# Patient Record
Sex: MALE | Race: WHITE | Employment: OTHER | URBAN - METROPOLITAN AREA
[De-identification: names, ages, dates, MRNs, and addresses within clinical notes are randomized per-mention and may not be internally consistent; named-entity substitution may affect disease eponyms.]

---

## 2018-08-27 ENCOUNTER — TRANSFERRED RECORDS (OUTPATIENT)
Dept: HEALTH INFORMATION MANAGEMENT | Facility: CLINIC | Age: 63
End: 2018-08-27

## 2018-09-21 ENCOUNTER — TRANSFERRED RECORDS (OUTPATIENT)
Dept: HEALTH INFORMATION MANAGEMENT | Facility: CLINIC | Age: 63
End: 2018-09-21

## 2018-10-24 ENCOUNTER — TRANSFERRED RECORDS (OUTPATIENT)
Dept: HEALTH INFORMATION MANAGEMENT | Facility: CLINIC | Age: 63
End: 2018-10-24

## 2018-10-25 ENCOUNTER — TELEPHONE (OUTPATIENT)
Dept: NEUROSURGERY | Facility: CLINIC | Age: 63
End: 2018-10-25

## 2018-10-25 ENCOUNTER — MEDICAL CORRESPONDENCE (OUTPATIENT)
Dept: HEALTH INFORMATION MANAGEMENT | Facility: CLINIC | Age: 63
End: 2018-10-25

## 2018-10-25 NOTE — TELEPHONE ENCOUNTER
----- Message from Dawna Shaikh sent at 10/25/2018 10:46 AM CDT -----  Regarding: Referral  Received referral from Delaney on pt. Unable to reach pt, LVM for patient to call back so that we can assist with scheduling. Referral and notes scanned in [facility].

## 2018-10-26 ENCOUNTER — TRANSFERRED RECORDS (OUTPATIENT)
Dept: HEALTH INFORMATION MANAGEMENT | Facility: CLINIC | Age: 63
End: 2018-10-26

## 2018-11-05 ENCOUNTER — OFFICE VISIT (OUTPATIENT)
Dept: NEUROSURGERY | Facility: CLINIC | Age: 63
End: 2018-11-05
Attending: NEUROLOGICAL SURGERY
Payer: COMMERCIAL

## 2018-11-05 VITALS
OXYGEN SATURATION: 93 % | DIASTOLIC BLOOD PRESSURE: 78 MMHG | TEMPERATURE: 97.6 F | HEART RATE: 67 BPM | SYSTOLIC BLOOD PRESSURE: 121 MMHG

## 2018-11-05 DIAGNOSIS — M51.9 DISC DISORDER OF LUMBAR REGION: Primary | ICD-10-CM

## 2018-11-05 PROCEDURE — G0463 HOSPITAL OUTPT CLINIC VISIT: HCPCS

## 2018-11-05 PROCEDURE — 99203 OFFICE O/P NEW LOW 30 MIN: CPT | Performed by: NEUROLOGICAL SURGERY

## 2018-11-05 RX ORDER — GABAPENTIN 300 MG/1
300 TABLET, FILM COATED ORAL
Status: ON HOLD | COMMUNITY
End: 2020-12-07

## 2018-11-05 RX ORDER — FAMOTIDINE 20 MG
TABLET ORAL DAILY
COMMUNITY

## 2018-11-05 RX ORDER — HYDROCHLOROTHIAZIDE 25 MG/1
25 TABLET ORAL DAILY
COMMUNITY

## 2018-11-05 ASSESSMENT — PAIN SCALES - GENERAL: PAINLEVEL: MODERATE PAIN (5)

## 2018-11-05 NOTE — MR AVS SNAPSHOT
"              After Visit Summary   11/5/2018    Subhash Cantu    MRN: [id number]           Patient Information     Date Of Birth          1955        Visit Information        Provider Department      11/5/2018 2:45 PM Vernon Irvin MD [facility]        Care Instructions    1. Referral to [facility] for L4-5 discogram  2. They will call you to schedule  3.  We will contact you with results and next steps after discogram    Please call our clinic with any questions or concerns: [phone]              Follow-ups after your visit        Who to contact     If you have questions or need follow up information about today's clinic visit or your schedule please contact [facility] NEUROSURGERY [facility] directly at [phone].  Normal or non-critical lab and imaging results will be communicated to you by MyChart, letter or phone within 4 business days after the clinic has received the results. If you do not hear from us within 7 days, please contact the clinic through MyChart or phone. If you have a critical or abnormal lab result, we will notify you by phone as soon as possible.  Submit refill requests through [employer] or call your pharmacy and they will forward the refill request to us. Please allow 3 business days for your refill to be completed.          Additional Information About Your Visit        MyChart Information     [employer] lets you send messages to your doctor, view your test results, renew your prescriptions, schedule appointments and more. To sign up, go to [email or web address] . Click on \"Log in\" on the left side of the screen, which will take you to the Welcome page. Then click on \"Sign up Now\" on the right side of the page.     You will be asked to enter the access code listed below, as well as some personal information. Please follow the directions to create your username and password.     Your access code is: [email or web address]  Expires: 2/3/2019  3:21 PM     Your " access code will  in 90 days. If you need help or a new code, please call your [place] clinic or [phone].        Care EveryWhere ID     This is your Care EveryWhere ID. This could be used by other organizations to access your [place] medical records  [id number]        Your Vitals Were     Pulse Temperature Pulse Oximetry             67 97.6  F (36.4  C) (Oral) 93%          Blood Pressure from Last 3 Encounters:   18 121/78    Weight from Last 3 Encounters:   No data found for Wt              Today, you had the following     No orders found for display      Information about OPIOIDS     PRESCRIPTION OPIOIDS: WHAT YOU NEED TO KNOW   We gave you an opioid (narcotic) pain medicine. It is important to manage your pain, but opioids are not always the best choice. You should first try all the other options your care team gave you. Take this medicine for as short a time (and as few doses) as possible.    Some activities can increase your pain, such as bandage changes or therapy sessions. It may help to take your pain medicine 30 to 60 minutes before these activities. Reduce your stress by getting enough sleep, working on hobbies you enjoy and practicing relaxation or meditation. Talk to your care team about ways to manage your pain beyond prescription opioids.    These medicines have risks:    DO NOT drive when on new or higher doses of pain medicine. These medicines can affect your alertness and reaction times, and you could be arrested for driving under the influence (DUI). If you need to use opioids long-term, talk to your care team about driving.    DO NOT operate heavy machinery    DO NOT do any other dangerous activities while taking these medicines.    DO NOT drink any alcohol while taking these medicines.     If the opioid prescribed includes acetaminophen, DO NOT take with any other medicines that contain acetaminophen. Read all labels carefully. Look for the word  acetaminophen  or  Tylenol.   Ask your pharmacist if you have questions or are unsure.    You can get addicted to pain medicines, especially if you have a history of addiction (chemical, alcohol or substance dependence). Talk to your care team about ways to reduce this risk.    All opioids tend to cause constipation. Drink plenty of water and eat foods that have a lot of fiber, such as fruits, vegetables, prune juice, apple juice and high-fiber cereal. Take a laxative (Miralax, milk of magnesia, Colace, Senna) if you don t move your bowels at least every other day. Other side effects include upset stomach, sleepiness, dizziness, throwing up, tolerance (needing more of the medicine to have the same effect), physical dependence and slowed breathing.    Store your pills in a secure place, locked if possible. We will not replace any lost or stolen medicine. If you don t finish your medicine, please throw away (dispose) as directed by your pharmacist. The [place] [employer]ution [employer] has more information about safe disposal: [email or web address]://[email or web address].[email or web address]/living-green/managing-unwanted-medications         Primary Care Provider Office Phone # Fax #    Abhinav SAUCEDO Becca [phone]        [facility] [address]        Equal Access to Services     MARK RUIZ : Viola payneo Soomaali, waaxda luqadaha, qaybta kaalmada adeegyada, cass rashid. So [facility] [phone].    ATENCIÓN: Si habla español, tiene a alvarez disposición servicios gratuitos de asistencia lingüística. Llame al [phone].    We comply with applicable federal civil rights laws and [place] laws. We do not discriminate on the basis of race, color, national origin, age, disability, sex, sexual orientation, or gender identity.            Thank you!     Thank you for choosing [facility] NEUROSURGERY [facility]  for your care. Our goal is always to provide you with excellent care. Hearing back from our  patients is one way we can continue to improve our services. Please take a few minutes to complete the written survey that you may receive in the mail after your visit with us. Thank you!             Your Updated Medication List - Protect others around you: Learn how to safely use, store and throw away your medicines at www.disposemymeds.org.          This list is accurate as of 11/5/18  3:21 PM.  Always use your most recent med list.                   Brand Name Dispense Instructions for use Diagnosis    FISH OIL + D3 PO           gabapentin 300 MG 24 hr tablet    GRALISE     Take 300 mg by mouth daily (with dinner) 2 am, 3 mid day, 3 at bed        hydrochlorothiazide 12.5 MG Tabs tablet      Take 25 mg by mouth daily        MULTIVITAMIN ADULT PO           NORVASC PO      Take 25 mg by mouth daily        OXYCODONE HCL PO      Take 5 mg by mouth        TRAZODONE HCL PO           VITAMIN C PO           Vitamin D (Cholecalciferol) 1000 units Caps

## 2018-11-05 NOTE — LETTER
11/5/2018         RE: Subhash Cantu  [address]  [place] [address]        Dear Colleague,    Thank you for referring your patient, Subhash Cantu, to the [facility] CLINIC. Please see a copy of my visit note below.    I was asked by Dr. Shabazz to see this patient in consultation    63M w/ hx L4-5 microdiscectomy (1989), with back pain.  Progressive back pain over several years with extensive management.  Numerous GOPAL, Lumbar RFA, and multiple courses of PT without improvement.  Currently using an LSO brace with minor improvement.  Prior RFA did not help at all.  Saw [facility], who had offered a 4 level lumbar fusion.  MRI with multiple levels of mild disc degeneration, worst at L4-5 with right laminotomies.       No past medical history on file.  No past surgical history on file.  Social History     Social History     Marital status:      Spouse name: N/A     Number of children: N/A     Years of education: N/A     Occupational History     Not on file.     Social History Main Topics     Smoking status: Not on file     Smokeless tobacco: Not on file     Alcohol use Not on file     Drug use: Not on file     Sexual activity: Not on file     Other Topics Concern     Not on file     Social History Narrative     No family history on file.     ROS: 10 point ROS neg other than the symptoms noted above in the HPI.    Physical Exam  There were no vitals taken for this visit.  HEENT:  Normocephalic, atraumatic.  PERRLA.  EOM s intact.  Visual fields full to gross exam  Neck:  Supple, non-tender, without lymphadenopathy.  Heart:  No peripheral edema  Lungs:  No SOB  Abdomen:  Non-distended.   Skin:  Warm and dry.  Extremities:  No edema, cyanosis or clubbing.  Psychiatric:  No apparent distress  Musculoskeletal:  Normal bulk and tone    NEUROLOGICAL EXAMINATION:     Mental status:  Alert and Oriented x 3, speech is fluent.  Cranial nerves:  II-XII intact.   Motor:     Shoulder Abduction:  Right:  5/5   Left:  5/5  Biceps:                      Right:  5/5   Left:  5/5  Triceps:                     Right:  5/5   Left:  5/5  Wrist Extensors:       Right:  5/5   Left:  5/5  Wrist Flexors:           Right:  5/5   Left:  5/5  interosseus :            Right:  5/5   Left:  5/5   Hip Flexor:                Right: 5/5  Left:  5/5  Hip Adductor:             Right:  5/5  Left:  5/5  Hip Abductor:             Right:  5/5  Left:  5/5  Gastroc Soleus:        Right:  5/5  Left:  5/5  Tib/Ant:                      Right:  5/5  Left:  5/5  EHL:                     Right:  5/5  Left:  5/5  Sensation:  Intact  Reflexes:  Negative Babinski.  Negative Clonus.  Negative Townsend's.  Coordination:  Smooth finger to nose testing.   Negative pronator drift.  Smooth tandem walking.    A/P:  63M w/ hx L4-5 microdiscectomy (1989), with back pain    I had a discussion with the patient, reviewing the history, symptoms, and imaging  Discussed that I would favor L4-5 discogram as a next step  Pending results, could discuss further options including arthroplasty versus single level fusion         Again, thank you for allowing me to participate in the care of your patient.        Sincerely,        Vernon Irvin MD

## 2018-11-05 NOTE — NURSING NOTE
Subhash Cantu is a 63 year old male who presents for:  Chief Complaint   Patient presents with     Neurologic Problem     referral from Delaney Hemphill for second opinion, surgical        Vitals:    Vitals:    11/05/18 1452   BP: 121/78   Pulse: 67   Temp: 97.6  F (36.4  C)   TempSrc: Oral   SpO2: 93%       BMI:  There is no height or weight on file to calculate BMI.    Pain Score:  Moderate Pain (5)        Dayana Barrera CMA, AAS

## 2018-11-05 NOTE — PROGRESS NOTES
I was asked by Dr. Shabazz to see this patient in consultation    63M w/ hx L4-5 microdiscectomy (1989), with back pain.  Progressive back pain over several years with extensive management.  Numerous GOPAL, Lumbar RFA, and multiple courses of PT without improvement.  Currently using an LSO brace with minor improvement.  Prior RFA did not help at all.  Saw [facility], who had offered a 4 level lumbar fusion.  MRI with multiple levels of mild disc degeneration, worst at L4-5 with right laminotomies.       No past medical history on file.  No past surgical history on file.  Social History     Social History     Marital status:      Spouse name: N/A     Number of children: N/A     Years of education: N/A     Occupational History     Not on file.     Social History Main Topics     Smoking status: Not on file     Smokeless tobacco: Not on file     Alcohol use Not on file     Drug use: Not on file     Sexual activity: Not on file     Other Topics Concern     Not on file     Social History Narrative     No family history on file.     ROS: 10 point ROS neg other than the symptoms noted above in the HPI.    Physical Exam  There were no vitals taken for this visit.  HEENT:  Normocephalic, atraumatic.  PERRLA.  EOM s intact.  Visual fields full to gross exam  Neck:  Supple, non-tender, without lymphadenopathy.  Heart:  No peripheral edema  Lungs:  No SOB  Abdomen:  Non-distended.   Skin:  Warm and dry.  Extremities:  No edema, cyanosis or clubbing.  Psychiatric:  No apparent distress  Musculoskeletal:  Normal bulk and tone    NEUROLOGICAL EXAMINATION:     Mental status:  Alert and Oriented x 3, speech is fluent.  Cranial nerves:  II-XII intact.   Motor:    Shoulder Abduction:  Right:  5/5   Left:  5/5  Biceps:                      Right:  5/5   Left:  5/5  Triceps:                     Right:  5/5   Left:  5/5  Wrist Extensors:       Right:  5/5   Left:  5/5  Wrist Flexors:           Right:  5/5   Left:   5/5  interosseus :            Right:  5/5   Left:  5/5   Hip Flexor:                Right: 5/5  Left:  5/5  Hip Adductor:             Right:  5/5  Left:  5/5  Hip Abductor:             Right:  5/5  Left:  5/5  Gastroc Soleus:        Right:  5/5  Left:  5/5  Tib/Ant:                      Right:  5/5  Left:  5/5  EHL:                     Right:  5/5  Left:  5/5  Sensation:  Intact  Reflexes:  Negative Babinski.  Negative Clonus.  Negative Townsend's.  Coordination:  Smooth finger to nose testing.   Negative pronator drift.  Smooth tandem walking.    A/P:  63M w/ hx L4-5 microdiscectomy (1989), with back pain    I had a discussion with the patient, reviewing the history, symptoms, and imaging  Discussed that I would favor L4-5 discogram as a next step  Pending results, could discuss further options including arthroplasty versus single level fusion

## 2018-11-05 NOTE — PATIENT INSTRUCTIONS
1. Referral to [facility] for L4-5 discogram  2. They will call you to schedule  3.  We will contact you with results and next steps after discogram    Please call our clinic with any questions or concerns: [phone]

## 2018-11-20 ENCOUNTER — TRANSFERRED RECORDS (OUTPATIENT)
Dept: HEALTH INFORMATION MANAGEMENT | Facility: CLINIC | Age: 63
End: 2018-11-20

## 2018-12-03 ENCOUNTER — OFFICE VISIT (OUTPATIENT)
Dept: NEUROSURGERY | Facility: CLINIC | Age: 63
End: 2018-12-03
Attending: NEUROLOGICAL SURGERY
Payer: COMMERCIAL

## 2018-12-03 VITALS
HEART RATE: 76 BPM | OXYGEN SATURATION: 92 % | SYSTOLIC BLOOD PRESSURE: 144 MMHG | DIASTOLIC BLOOD PRESSURE: 84 MMHG | TEMPERATURE: 98.6 F

## 2018-12-03 DIAGNOSIS — M51.9 DISC DISORDER OF LUMBAR REGION: Primary | ICD-10-CM

## 2018-12-03 PROCEDURE — 99213 OFFICE O/P EST LOW 20 MIN: CPT | Performed by: NEUROLOGICAL SURGERY

## 2018-12-03 PROCEDURE — G0463 HOSPITAL OUTPT CLINIC VISIT: HCPCS

## 2018-12-03 ASSESSMENT — PAIN SCALES - GENERAL: PAINLEVEL: SEVERE PAIN (6)

## 2018-12-03 NOTE — NURSING NOTE
Subhash Cantu is a 63 year old male who presents for:  Chief Complaint   Patient presents with     Neurologic Problem     follow up lumbar DDD,  discogram denied by insurance        Vitals:    Vitals:    12/03/18 1517   BP: 144/84   BP Location: Right arm   Patient Position: Sitting   Cuff Size: Adult Large   Pulse: 76   Temp: 98.6  F (37  C)   TempSrc: Oral   SpO2: 92%       BMI:  There is no height or weight on file to calculate BMI.    Pain Score:  Severe Pain (6)        Dayana Barrera CMA, AAS

## 2018-12-03 NOTE — LETTER
12/3/2018         RE: Subhash Cantu  [address]  [place] [address]        Dear Colleague,    Thank you for referring your patient, Subhash Cantu, to the [facility]UROSURGERY CLINIC. Please see a copy of my visit note below.    63M w/ hx L4-5 microdiscectomy (1989), with back pain.  Progressive back pain over several years with extensive management.  Numerous GOPAL, Lumbar RFA, and multiple courses of PT without improvement.  Currently using an LSO brace with minor improvement.  Prior RFA did not help at all.  Saw [facility], who had offered a 4 level lumbar fusion.  MRI with multiple levels of mild disc degeneration, worst at L4-5 with right laminotomies.     Returns for follow up.  Discogram was denied by insurance.    History reviewed. No pertinent past medical history.  History reviewed. No pertinent surgical history.  Social History     Social History     Marital status:      Spouse name: N/A     Number of children: N/A     Years of education: N/A     Occupational History     Not on file.     Social History Main Topics     Smoking status: Former Smoker     Smokeless tobacco: Never Used      Comment: former smoker     Alcohol use Not on file     Drug use: Not on file     Sexual activity: Not on file     Other Topics Concern     Not on file     Social History Narrative     History reviewed. No pertinent family history.     ROS: 10 point ROS neg other than the symptoms noted above in the HPI.    Physical Exam  /84 (BP Location: Right arm, Patient Position: Sitting, Cuff Size: Adult Large)  Pulse 76  Temp 98.6  F (37  C) (Oral)  SpO2 92%  HEENT:  Normocephalic, atraumatic.  PERRLA.  EOM s intact.  Visual fields full to gross exam  Neck:  Supple, non-tender, without lymphadenopathy.  Heart:  No peripheral edema  Lungs:  No SOB  Abdomen:  Non-distended.   Skin:  Warm and dry.  Extremities:  No edema, cyanosis or clubbing.  Psychiatric:  No apparent  distress  Musculoskeletal:  Normal bulk and tone    NEUROLOGICAL EXAMINATION:     Mental status:  Alert and Oriented x 3, speech is fluent.  Cranial nerves:  II-XII intact.   Motor:    Shoulder Abduction:  Right:  5/5   Left:  5/5  Biceps:                      Right:  5/5   Left:  5/5  Triceps:                     Right:  5/5   Left:  5/5  Wrist Extensors:       Right:  5/5   Left:  5/5  Wrist Flexors:           Right:  5/5   Left:  5/5  interosseus :            Right:  5/5   Left:  5/5   Hip Flexor:                Right: 5/5  Left:  5/5  Hip Adductor:             Right:  5/5  Left:  5/5  Hip Abductor:             Right:  5/5  Left:  5/5  Gastroc Soleus:        Right:  5/5  Left:  5/5  Tib/Ant:                      Right:  5/5  Left:  5/5  EHL:                     Right:  5/5  Left:  5/5  Sensation:  Intact  Reflexes:  Negative Babinski.  Negative Clonus.  Negative Townsend's.  Coordination:  Smooth finger to nose testing.   Negative pronator drift.  Smooth tandem walking.    A/P:  63M w/ hx L4-5 microdiscectomy (1989), with back pain    Discogram was denied by insurance  We discussed that for a surgical option, I would favor L4-5 TLIF, but only if he felt that he exhausted medical options and if the pain was debilitating  He will discuss with his family and reach out to us in the future         Again, thank you for allowing me to participate in the care of your patient.        Sincerely,        Vernon Irvin MD

## 2018-12-03 NOTE — MR AVS SNAPSHOT
"              After Visit Summary   12/3/2018    Subhash Cantu    MRN: [id number]           Patient Information     Date Of Birth          1955        Visit Information        Provider Department      12/3/2018 3:00 PM Vernon Irvin MD [facility] Neurosurgery [facility]        Today's Diagnoses     Disc disorder of lumbar region    -  1       Follow-ups after your visit        Who to contact     If you have questions or need follow up information about today's clinic visit or your schedule please contact [facility] NEUROSURGERY [facility] directly at [phone].  Normal or non-critical lab and imaging results will be communicated to you by Billboard Junglehart, letter or phone within 4 business days after the clinic has received the results. If you do not hear from us within 7 days, please contact the clinic through [employer] Junglehart or phone. If you have a critical or abnormal lab result, we will notify you by phone as soon as possible.  Submit refill requests through [employer] or call your pharmacy and they will forward the refill request to us. Please allow 3 business days for your refill to be completed.          Additional Information About Your Visit        MyChart Information     [employer] lets you send messages to your doctor, view your test results, renew your prescriptions, schedule appointments and more. To sign up, go to [email or web address] . Click on \"Log in\" on the left side of the screen, which will take you to the Welcome page. Then click on \"Sign up Now\" on the right side of the page.     You will be asked to enter the access code listed below, as well as some personal information. Please follow the directions to create your username and password.     Your access code is: [email or web address]  Expires: 2/3/2019  3:21 PM     Your access code will  in 90 days. If you need help or a new code, please call your [place] clinic or [phone].        Care EveryWhere ID     This is your Care EveryWhere ID. " This could be used by other organizations to access your [place] medical records  [id number]        Your Vitals Were     Pulse Temperature Pulse Oximetry             76 98.6  F (37  C) (Oral) 92%          Blood Pressure from Last 3 Encounters:   12/03/18 144/84   11/05/18 121/78    Weight from Last 3 Encounters:   No data found for Wt              Today, you had the following     No orders found for display       Primary Care Provider Office Phone # Fax #    Abhinav Hudson [phone]        [facility] [address] [address]        Equal Access to Services     [facility]: Hadii aad ku hadasho Soomaali, waaxda luqadaha, qaybta kaalmada adeegyada, cass ledesma . So [facility] [phone].    ATENCIÓN: Si habla español, tiene a alvarez disposición servicios gratuitos de asistencia lingüística. [facility] [phone].    We comply with applicable federal civil rights laws and [place] laws. We do not discriminate on the basis of race, color, national origin, age, disability, sex, sexual orientation, or gender identity.            Thank you!     Thank you for choosing [facility] NEUROSURGERY CLINIC  for your care. Our goal is always to provide you with excellent care. Hearing back from our patients is one way we can continue to improve our services. Please take a few minutes to complete the written survey that you may receive in the mail after your visit with us. Thank you!             Your Updated Medication List - Protect others around you: Learn how to safely use, store and throw away your medicines at www.disposemymeds.org.          This list is accurate as of 12/3/18  3:52 PM.  Always use your most recent med list.                   Brand Name Dispense Instructions for use Diagnosis    FISH OIL + D3 PO           gabapentin 300 MG 24 hr tablet    GRALISE     Take 300 mg by mouth daily (with dinner) 2 am, 3 mid day, 3 at bed         hydrochlorothiazide 12.5 MG tablet    HYDRODIURIL     Take 25 mg by mouth daily        MULTIVITAMIN ADULT PO           NORVASC PO      Take 25 mg by mouth daily        OXYCODONE HCL PO      Take 5 mg by mouth        TRAZODONE HCL PO           VITAMIN C PO           Vitamin D (Cholecalciferol) 1000 units Caps

## 2018-12-03 NOTE — PROGRESS NOTES
63M w/ hx L4-5 microdiscectomy (1989), with back pain.  Progressive back pain over several years with extensive management.  Numerous GOPAL, Lumbar RFA, and multiple courses of PT without improvement.  Currently using an LSO brace with minor improvement.  Prior RFA did not help at all.  Saw [facility], who had offered a 4 level lumbar fusion.  MRI with multiple levels of mild disc degeneration, worst at L4-5 with right laminotomies.     Returns for follow up.  Discogram was denied by insurance.    History reviewed. No pertinent past medical history.  History reviewed. No pertinent surgical history.  Social History     Social History     Marital status:      Spouse name: N/A     Number of children: N/A     Years of education: N/A     Occupational History     Not on file.     Social History Main Topics     Smoking status: Former Smoker     Smokeless tobacco: Never Used      Comment: former smoker     Alcohol use Not on file     Drug use: Not on file     Sexual activity: Not on file     Other Topics Concern     Not on file     Social History Narrative     History reviewed. No pertinent family history.     ROS: 10 point ROS neg other than the symptoms noted above in the HPI.    Physical Exam  /84 (BP Location: Right arm, Patient Position: Sitting, Cuff Size: Adult Large)  Pulse 76  Temp 98.6  F (37  C) (Oral)  SpO2 92%  HEENT:  Normocephalic, atraumatic.  PERRLA.  EOM s intact.  Visual fields full to gross exam  Neck:  Supple, non-tender, without lymphadenopathy.  Heart:  No peripheral edema  Lungs:  No SOB  Abdomen:  Non-distended.   Skin:  Warm and dry.  Extremities:  No edema, cyanosis or clubbing.  Psychiatric:  No apparent distress  Musculoskeletal:  Normal bulk and tone    NEUROLOGICAL EXAMINATION:     Mental status:  Alert and Oriented x 3, speech is fluent.  Cranial nerves:  II-XII intact.   Motor:    Shoulder Abduction:  Right:  5/5   Left:  5/5  Biceps:                      Right:  5/5    Left:  5/5  Triceps:                     Right:  5/5   Left:  5/5  Wrist Extensors:       Right:  5/5   Left:  5/5  Wrist Flexors:           Right:  5/5   Left:  5/5  interosseus :            Right:  5/5   Left:  5/5   Hip Flexor:                Right: 5/5  Left:  5/5  Hip Adductor:             Right:  5/5  Left:  5/5  Hip Abductor:             Right:  5/5  Left:  5/5  Gastroc Soleus:        Right:  5/5  Left:  5/5  Tib/Ant:                      Right:  5/5  Left:  5/5  EHL:                     Right:  5/5  Left:  5/5  Sensation:  Intact  Reflexes:  Negative Babinski.  Negative Clonus.  Negative Townsend's.  Coordination:  Smooth finger to nose testing.   Negative pronator drift.  Smooth tandem walking.    A/P:  63M w/ hx L4-5 microdiscectomy (1989), with back pain    Discogram was denied by insurance  We discussed that for a surgical option, I would favor L4-5 TLIF, but only if he felt that he exhausted medical options and if the pain was debilitating  He will discuss with his family and reach out to us in the future

## 2018-12-19 ENCOUNTER — TRANSFERRED RECORDS (OUTPATIENT)
Dept: HEALTH INFORMATION MANAGEMENT | Facility: CLINIC | Age: 63
End: 2018-12-19

## 2018-12-19 LAB — PHQ9 SCORE: 3

## 2019-02-19 ENCOUNTER — TRANSFERRED RECORDS (OUTPATIENT)
Dept: HEALTH INFORMATION MANAGEMENT | Facility: CLINIC | Age: 64
End: 2019-02-19

## 2019-03-20 ENCOUNTER — TRANSFERRED RECORDS (OUTPATIENT)
Dept: HEALTH INFORMATION MANAGEMENT | Facility: CLINIC | Age: 64
End: 2019-03-20

## 2019-04-22 ENCOUNTER — TRANSFERRED RECORDS (OUTPATIENT)
Dept: HEALTH INFORMATION MANAGEMENT | Facility: CLINIC | Age: 64
End: 2019-04-22

## 2019-05-22 ENCOUNTER — TRANSFERRED RECORDS (OUTPATIENT)
Dept: HEALTH INFORMATION MANAGEMENT | Facility: CLINIC | Age: 64
End: 2019-05-22

## 2019-06-19 ENCOUNTER — TRANSFERRED RECORDS (OUTPATIENT)
Dept: HEALTH INFORMATION MANAGEMENT | Facility: CLINIC | Age: 64
End: 2019-06-19

## 2019-06-19 LAB — PHQ9 SCORE: 3

## 2019-07-22 ENCOUNTER — TRANSFERRED RECORDS (OUTPATIENT)
Dept: HEALTH INFORMATION MANAGEMENT | Facility: CLINIC | Age: 64
End: 2019-07-22

## 2019-09-20 ENCOUNTER — TRANSFERRED RECORDS (OUTPATIENT)
Dept: HEALTH INFORMATION MANAGEMENT | Facility: CLINIC | Age: 64
End: 2019-09-20

## 2019-10-23 ENCOUNTER — TRANSFERRED RECORDS (OUTPATIENT)
Dept: HEALTH INFORMATION MANAGEMENT | Facility: CLINIC | Age: 64
End: 2019-10-23

## 2019-11-19 ENCOUNTER — TRANSFERRED RECORDS (OUTPATIENT)
Dept: HEALTH INFORMATION MANAGEMENT | Facility: CLINIC | Age: 64
End: 2019-11-19

## 2019-12-20 ENCOUNTER — TRANSFERRED RECORDS (OUTPATIENT)
Dept: HEALTH INFORMATION MANAGEMENT | Facility: CLINIC | Age: 64
End: 2019-12-20

## 2020-01-21 ENCOUNTER — TRANSFERRED RECORDS (OUTPATIENT)
Dept: HEALTH INFORMATION MANAGEMENT | Facility: CLINIC | Age: 65
End: 2020-01-21

## 2020-02-21 ENCOUNTER — TRANSFERRED RECORDS (OUTPATIENT)
Dept: HEALTH INFORMATION MANAGEMENT | Facility: CLINIC | Age: 65
End: 2020-02-21

## 2020-03-18 ENCOUNTER — TRANSFERRED RECORDS (OUTPATIENT)
Dept: HEALTH INFORMATION MANAGEMENT | Facility: CLINIC | Age: 65
End: 2020-03-18

## 2020-03-18 LAB — PHQ9 SCORE: 1

## 2020-06-17 ENCOUNTER — TRANSFERRED RECORDS (OUTPATIENT)
Dept: HEALTH INFORMATION MANAGEMENT | Facility: CLINIC | Age: 65
End: 2020-06-17

## 2020-10-19 ENCOUNTER — HOSPITAL ENCOUNTER (INPATIENT)
Facility: CLINIC | Age: 65
Setting detail: SURGERY ADMIT
End: 2020-10-19
Attending: NEUROLOGICAL SURGERY | Admitting: NEUROLOGICAL SURGERY
Payer: COMMERCIAL

## 2020-10-22 DIAGNOSIS — Z11.59 ENCOUNTER FOR SCREENING FOR OTHER VIRAL DISEASES: Primary | ICD-10-CM

## 2020-11-25 RX ORDER — BACLOFEN 10 MG/1
10 TABLET ORAL 3 TIMES DAILY PRN
COMMUNITY

## 2020-11-25 RX ORDER — DOXAZOSIN 8 MG/1
8 TABLET ORAL
COMMUNITY

## 2020-11-25 RX ORDER — ASPIRIN 81 MG/1
81 TABLET ORAL DAILY
Status: ON HOLD | COMMUNITY
End: 2020-12-06

## 2020-11-25 RX ORDER — SODIUM PHOSPHATE,MONO-DIBASIC 19G-7G/118
1 ENEMA (ML) RECTAL DAILY
COMMUNITY

## 2020-11-25 RX ORDER — CHLORAL HYDRATE 500 MG
2 CAPSULE ORAL DAILY
COMMUNITY

## 2020-11-25 RX ORDER — MULTIVIT-MIN/IRON/FOLIC ACID/K 18-600-40
CAPSULE ORAL DAILY
COMMUNITY

## 2020-11-25 ASSESSMENT — MIFFLIN-ST. JEOR: SCORE: 1762.05

## 2020-12-01 DIAGNOSIS — Z11.59 ENCOUNTER FOR SCREENING FOR OTHER VIRAL DISEASES: ICD-10-CM

## 2020-12-01 PROCEDURE — 36415 COLL VENOUS BLD VENIPUNCTURE: CPT | Performed by: NEUROLOGICAL SURGERY

## 2020-12-01 PROCEDURE — U0003 INFECTIOUS AGENT DETECTION BY NUCLEIC ACID (DNA OR RNA); SEVERE ACUTE RESPIRATORY SYNDROME CORONAVIRUS 2 (SARS-COV-2) (CORONAVIRUS DISEASE [COVID-19]), AMPLIFIED PROBE TECHNIQUE, MAKING USE OF HIGH THROUGHPUT TECHNOLOGIES AS DESCRIBED BY CMS-2020-01-R: HCPCS | Performed by: NEUROLOGICAL SURGERY

## 2020-12-02 DIAGNOSIS — Z11.59 ENCOUNTER FOR SCREENING FOR OTHER VIRAL DISEASES: Primary | ICD-10-CM

## 2020-12-02 LAB
SARS-COV-2 RNA SPEC QL NAA+PROBE: NOT DETECTED
SPECIMEN SOURCE: NORMAL

## 2020-12-05 ENCOUNTER — ANESTHESIA EVENT (OUTPATIENT)
Dept: SURGERY | Facility: CLINIC | Age: 65
End: 2020-12-05

## 2020-12-05 ENCOUNTER — HOSPITAL ENCOUNTER (INPATIENT)
Facility: CLINIC | Age: 65
LOS: 2 days | Discharge: HOME | DRG: 455 | End: 2020-12-07
Attending: NEUROLOGICAL SURGERY | Admitting: NEUROLOGICAL SURGERY
Payer: COMMERCIAL

## 2020-12-05 ENCOUNTER — ANESTHESIA (OUTPATIENT)
Dept: SURGERY | Facility: CLINIC | Age: 65
End: 2020-12-05

## 2020-12-05 DIAGNOSIS — Z98.1 S/P LUMBAR FUSION: Primary | ICD-10-CM

## 2020-12-05 LAB
CREAT SERPL-MCNC: 0.8 MG/DL (ref 0.66–1.25)
GFR SERPL CREATININE-BSD FRML MDRD: >90 ML/MIN/{1.73_M2}
GLUCOSE BLDC GLUCOMTR-MCNC: 111 MG/DL (ref 70–99)
HBA1C MFR BLD: 5.8 % (ref 0–5.6)

## 2020-12-05 PROCEDURE — 36415 COLL VENOUS BLD VENIPUNCTURE: CPT | Performed by: NEUROLOGICAL SURGERY

## 2020-12-05 PROCEDURE — 250N000011 HC RX IP 250 OP 636: Performed by: NURSE ANESTHETIST, CERTIFIED REGISTERED

## 2020-12-05 PROCEDURE — 250N000009 HC RX 250: Performed by: NEUROLOGICAL SURGERY

## 2020-12-05 PROCEDURE — 370N000001 HC ANESTHESIA TECHNICAL FEE, 1ST 30 MIN: Performed by: NEUROLOGICAL SURGERY

## 2020-12-05 PROCEDURE — 250N000009 HC RX 250

## 2020-12-05 PROCEDURE — 36415 COLL VENOUS BLD VENIPUNCTURE: CPT | Performed by: INTERNAL MEDICINE

## 2020-12-05 PROCEDURE — 83036 HEMOGLOBIN GLYCOSYLATED A1C: CPT | Performed by: INTERNAL MEDICINE

## 2020-12-05 PROCEDURE — 250N000013 HC RX MED GY IP 250 OP 250 PS 637: Performed by: NEUROLOGICAL SURGERY

## 2020-12-05 PROCEDURE — 01NR0ZZ RELEASE SACRAL NERVE, OPEN APPROACH: ICD-10-PCS | Performed by: NEUROLOGICAL SURGERY

## 2020-12-05 PROCEDURE — 120N000001 HC R&B MED SURG/OB

## 2020-12-05 PROCEDURE — 278N000064 HC OR IMPLANT OTHER OPNP: Performed by: NEUROLOGICAL SURGERY

## 2020-12-05 PROCEDURE — 0ST20ZZ RESECTION OF LUMBAR VERTEBRAL DISC, OPEN APPROACH: ICD-10-PCS | Performed by: NEUROLOGICAL SURGERY

## 2020-12-05 PROCEDURE — 761N000001 HC RECOVERY PHASE 1 LEVEL 1 FIRST HR: Performed by: NEUROLOGICAL SURGERY

## 2020-12-05 PROCEDURE — 8E0WXBF COMPUTER ASSISTED PROCEDURE OF TRUNK REGION, WITH FLUOROSCOPY: ICD-10-PCS | Performed by: NEUROLOGICAL SURGERY

## 2020-12-05 PROCEDURE — 82947 ASSAY GLUCOSE BLOOD QUANT: CPT | Performed by: ANESTHESIOLOGY

## 2020-12-05 PROCEDURE — C1713 ANCHOR/SCREW BN/BN,TIS/BN: HCPCS | Performed by: NEUROLOGICAL SURGERY

## 2020-12-05 PROCEDURE — 0SG30AJ FUSION OF LUMBOSACRAL JOINT WITH INTERBODY FUSION DEVICE, POSTERIOR APPROACH, ANTERIOR COLUMN, OPEN APPROACH: ICD-10-PCS | Performed by: NEUROLOGICAL SURGERY

## 2020-12-05 PROCEDURE — 250N000013 HC RX MED GY IP 250 OP 250 PS 637: Performed by: INTERNAL MEDICINE

## 2020-12-05 PROCEDURE — 360N000038 HC SURGERY LEVEL 6 1ST 30 MIN: Performed by: NEUROLOGICAL SURGERY

## 2020-12-05 PROCEDURE — 0ST40ZZ RESECTION OF LUMBOSACRAL DISC, OPEN APPROACH: ICD-10-PCS | Performed by: NEUROLOGICAL SURGERY

## 2020-12-05 PROCEDURE — 250N000011 HC RX IP 250 OP 636: Performed by: NEUROLOGICAL SURGERY

## 2020-12-05 PROCEDURE — 250N000009 HC RX 250: Performed by: NURSE ANESTHETIST, CERTIFIED REGISTERED

## 2020-12-05 PROCEDURE — 370N000002 HC ANESTHESIA TECHNICAL FEE, EACH ADDTL 15 MIN: Performed by: NEUROLOGICAL SURGERY

## 2020-12-05 PROCEDURE — 258N000003 HC RX IP 258 OP 636: Performed by: ANESTHESIOLOGY

## 2020-12-05 PROCEDURE — C1762 CONN TISS, HUMAN(INC FASCIA): HCPCS | Performed by: NEUROLOGICAL SURGERY

## 2020-12-05 PROCEDURE — 250N000006 HC OR RX SURGIFLO W/THROMBIN KIT 2ML 1991 OPNP: Performed by: NEUROLOGICAL SURGERY

## 2020-12-05 PROCEDURE — 999N000138 HC STATISTIC PRE-PROCEDURE ASSESSMENT I: Performed by: NEUROLOGICAL SURGERY

## 2020-12-05 PROCEDURE — 360N000039 HC SURGERY LEVEL 6 EA 15 ADDTL MIN: Performed by: NEUROLOGICAL SURGERY

## 2020-12-05 PROCEDURE — 999N001017 HC STATISTIC GLUCOSE BY METER IP

## 2020-12-05 PROCEDURE — 0SG10AJ FUSION OF 2 OR MORE LUMBAR VERTEBRAL JOINTS WITH INTERBODY FUSION DEVICE, POSTERIOR APPROACH, ANTERIOR COLUMN, OPEN APPROACH: ICD-10-PCS | Performed by: NEUROLOGICAL SURGERY

## 2020-12-05 PROCEDURE — 99222 1ST HOSP IP/OBS MODERATE 55: CPT | Performed by: INTERNAL MEDICINE

## 2020-12-05 PROCEDURE — 99207 PR CONSULT E&M CHANGED TO INITIAL LEVEL: CPT | Performed by: INTERNAL MEDICINE

## 2020-12-05 PROCEDURE — 01NB0ZZ RELEASE LUMBAR NERVE, OPEN APPROACH: ICD-10-PCS | Performed by: NEUROLOGICAL SURGERY

## 2020-12-05 PROCEDURE — C1763 CONN TISS, NON-HUMAN: HCPCS | Performed by: NEUROLOGICAL SURGERY

## 2020-12-05 PROCEDURE — 258N000003 HC RX IP 258 OP 636: Performed by: NURSE ANESTHETIST, CERTIFIED REGISTERED

## 2020-12-05 PROCEDURE — 0SG3071 FUSION OF LUMBOSACRAL JOINT WITH AUTOLOGOUS TISSUE SUBSTITUTE, POSTERIOR APPROACH, POSTERIOR COLUMN, OPEN APPROACH: ICD-10-PCS | Performed by: NEUROLOGICAL SURGERY

## 2020-12-05 PROCEDURE — 272N000001 HC OR GENERAL SUPPLY STERILE: Performed by: NEUROLOGICAL SURGERY

## 2020-12-05 PROCEDURE — 250N000003 HC SEVOFLURANE, EA 15 MIN: Performed by: NEUROLOGICAL SURGERY

## 2020-12-05 PROCEDURE — 82565 ASSAY OF CREATININE: CPT | Performed by: NEUROLOGICAL SURGERY

## 2020-12-05 DEVICE — 7.5X45 HA COATED SCREW, PREASSEMBLED, CREO 5.5MM
Type: IMPLANTABLE DEVICE | Site: SPINE LUMBAR | Status: FUNCTIONAL
Brand: CREO

## 2020-12-05 DEVICE — GRAFT BONE CRUSH CANC 30ML 400080: Type: IMPLANTABLE DEVICE | Site: SPINE LUMBAR | Status: FUNCTIONAL

## 2020-12-05 DEVICE — GRAFT BONE CORT CANC 30ML 400051: Type: IMPLANTABLE DEVICE | Site: SPINE LUMBAR | Status: FUNCTIONAL

## 2020-12-05 RX ORDER — SODIUM CHLORIDE, SODIUM LACTATE, POTASSIUM CHLORIDE, CALCIUM CHLORIDE 600; 310; 30; 20 MG/100ML; MG/100ML; MG/100ML; MG/100ML
INJECTION, SOLUTION INTRAVENOUS CONTINUOUS
Status: DISCONTINUED | OUTPATIENT
Start: 2020-12-05 | End: 2020-12-05 | Stop reason: HOSPADM

## 2020-12-05 RX ORDER — DEXAMETHASONE SODIUM PHOSPHATE 4 MG/ML
INJECTION, SOLUTION INTRA-ARTICULAR; INTRALESIONAL; INTRAMUSCULAR; INTRAVENOUS; SOFT TISSUE PRN
Status: DISCONTINUED | OUTPATIENT
Start: 2020-12-05 | End: 2020-12-05

## 2020-12-05 RX ORDER — POLYETHYLENE GLYCOL 3350 17 G/17G
17 POWDER, FOR SOLUTION ORAL DAILY
Status: DISCONTINUED | OUTPATIENT
Start: 2020-12-05 | End: 2020-12-07 | Stop reason: HOSPADM

## 2020-12-05 RX ORDER — NALOXONE HYDROCHLORIDE 0.4 MG/ML
0.2 INJECTION, SOLUTION INTRAMUSCULAR; INTRAVENOUS; SUBCUTANEOUS
Status: ACTIVE | OUTPATIENT
Start: 2020-12-05 | End: 2020-12-06

## 2020-12-05 RX ORDER — VANCOMYCIN HYDROCHLORIDE 1 G/20ML
INJECTION, POWDER, LYOPHILIZED, FOR SOLUTION INTRAVENOUS PRN
Status: DISCONTINUED | OUTPATIENT
Start: 2020-12-05 | End: 2020-12-05 | Stop reason: HOSPADM

## 2020-12-05 RX ORDER — CEFAZOLIN SODIUM 2 G/100ML
2 INJECTION, SOLUTION INTRAVENOUS
Status: COMPLETED | OUTPATIENT
Start: 2020-12-05 | End: 2020-12-05

## 2020-12-05 RX ORDER — PROPOFOL 10 MG/ML
INJECTION, EMULSION INTRAVENOUS PRN
Status: DISCONTINUED | OUTPATIENT
Start: 2020-12-05 | End: 2020-12-05

## 2020-12-05 RX ORDER — MAGNESIUM HYDROXIDE 1200 MG/15ML
LIQUID ORAL PRN
Status: DISCONTINUED | OUTPATIENT
Start: 2020-12-05 | End: 2020-12-05 | Stop reason: HOSPADM

## 2020-12-05 RX ORDER — LIDOCAINE 40 MG/G
CREAM TOPICAL
Status: DISCONTINUED | OUTPATIENT
Start: 2020-12-05 | End: 2020-12-07 | Stop reason: HOSPADM

## 2020-12-05 RX ORDER — ONDANSETRON 2 MG/ML
INJECTION INTRAMUSCULAR; INTRAVENOUS PRN
Status: DISCONTINUED | OUTPATIENT
Start: 2020-12-05 | End: 2020-12-05

## 2020-12-05 RX ORDER — NALOXONE HYDROCHLORIDE 0.4 MG/ML
0.4 INJECTION, SOLUTION INTRAMUSCULAR; INTRAVENOUS; SUBCUTANEOUS
Status: ACTIVE | OUTPATIENT
Start: 2020-12-05 | End: 2020-12-06

## 2020-12-05 RX ORDER — HYDROMORPHONE HYDROCHLORIDE 1 MG/ML
.3-.5 INJECTION, SOLUTION INTRAMUSCULAR; INTRAVENOUS; SUBCUTANEOUS EVERY 5 MIN PRN
Status: DISCONTINUED | OUTPATIENT
Start: 2020-12-05 | End: 2020-12-05 | Stop reason: HOSPADM

## 2020-12-05 RX ORDER — OXYCODONE HYDROCHLORIDE 5 MG/1
5-10 TABLET ORAL EVERY 4 HOURS PRN
Status: DISCONTINUED | OUTPATIENT
Start: 2020-12-05 | End: 2020-12-06 | Stop reason: ALTCHOICE

## 2020-12-05 RX ORDER — HYDROXYZINE HYDROCHLORIDE 10 MG/1
10 TABLET, FILM COATED ORAL EVERY 6 HOURS PRN
Status: DISCONTINUED | OUTPATIENT
Start: 2020-12-05 | End: 2020-12-07 | Stop reason: HOSPADM

## 2020-12-05 RX ORDER — METHOCARBAMOL 750 MG/1
750 TABLET, FILM COATED ORAL 4 TIMES DAILY PRN
Status: DISCONTINUED | OUTPATIENT
Start: 2020-12-05 | End: 2020-12-07 | Stop reason: HOSPADM

## 2020-12-05 RX ORDER — VECURONIUM BROMIDE 1 MG/ML
INJECTION, POWDER, LYOPHILIZED, FOR SOLUTION INTRAVENOUS PRN
Status: DISCONTINUED | OUTPATIENT
Start: 2020-12-05 | End: 2020-12-05

## 2020-12-05 RX ORDER — ACETAMINOPHEN 325 MG/1
975 TABLET ORAL EVERY 8 HOURS
Status: DISCONTINUED | OUTPATIENT
Start: 2020-12-05 | End: 2020-12-05

## 2020-12-05 RX ORDER — FAMOTIDINE 20 MG/1
20 TABLET, FILM COATED ORAL 2 TIMES DAILY
Status: DISCONTINUED | OUTPATIENT
Start: 2020-12-05 | End: 2020-12-07 | Stop reason: HOSPADM

## 2020-12-05 RX ORDER — FENTANYL CITRATE 50 UG/ML
INJECTION, SOLUTION INTRAMUSCULAR; INTRAVENOUS PRN
Status: DISCONTINUED | OUTPATIENT
Start: 2020-12-05 | End: 2020-12-05

## 2020-12-05 RX ORDER — TRAZODONE HYDROCHLORIDE 50 MG/1
150 TABLET, FILM COATED ORAL
Status: DISCONTINUED | OUTPATIENT
Start: 2020-12-05 | End: 2020-12-07 | Stop reason: HOSPADM

## 2020-12-05 RX ORDER — SODIUM CHLORIDE AND POTASSIUM CHLORIDE 150; 900 MG/100ML; MG/100ML
INJECTION, SOLUTION INTRAVENOUS CONTINUOUS
Status: DISCONTINUED | OUTPATIENT
Start: 2020-12-05 | End: 2020-12-06

## 2020-12-05 RX ORDER — MEPERIDINE HYDROCHLORIDE 25 MG/ML
12.5 INJECTION INTRAMUSCULAR; INTRAVENOUS; SUBCUTANEOUS EVERY 5 MIN PRN
Status: DISCONTINUED | OUTPATIENT
Start: 2020-12-05 | End: 2020-12-05 | Stop reason: HOSPADM

## 2020-12-05 RX ORDER — AMOXICILLIN 250 MG
2 CAPSULE ORAL 2 TIMES DAILY
Status: DISCONTINUED | OUTPATIENT
Start: 2020-12-05 | End: 2020-12-07 | Stop reason: HOSPADM

## 2020-12-05 RX ORDER — CEFAZOLIN SODIUM 2 G/100ML
2 INJECTION, SOLUTION INTRAVENOUS EVERY 8 HOURS
Status: DISCONTINUED | OUTPATIENT
Start: 2020-12-05 | End: 2020-12-07 | Stop reason: HOSPADM

## 2020-12-05 RX ORDER — ACETAMINOPHEN 325 MG/1
975 TABLET ORAL EVERY 8 HOURS
Status: DISCONTINUED | OUTPATIENT
Start: 2020-12-05 | End: 2020-12-07 | Stop reason: HOSPADM

## 2020-12-05 RX ORDER — TRAMADOL HYDROCHLORIDE 50 MG/1
50 TABLET ORAL EVERY 6 HOURS PRN
Status: DISCONTINUED | OUTPATIENT
Start: 2020-12-05 | End: 2020-12-07 | Stop reason: HOSPADM

## 2020-12-05 RX ORDER — GABAPENTIN 300 MG/1
300 TABLET, FILM COATED ORAL
Status: DISCONTINUED | OUTPATIENT
Start: 2020-12-05 | End: 2020-12-05 | Stop reason: CLARIF

## 2020-12-05 RX ORDER — ONDANSETRON 4 MG/1
4 TABLET, ORALLY DISINTEGRATING ORAL EVERY 30 MIN PRN
Status: DISCONTINUED | OUTPATIENT
Start: 2020-12-05 | End: 2020-12-05 | Stop reason: HOSPADM

## 2020-12-05 RX ORDER — LIDOCAINE HYDROCHLORIDE 20 MG/ML
INJECTION, SOLUTION INFILTRATION; PERINEURAL PRN
Status: DISCONTINUED | OUTPATIENT
Start: 2020-12-05 | End: 2020-12-05

## 2020-12-05 RX ORDER — ACETAMINOPHEN 325 MG/1
650 TABLET ORAL EVERY 4 HOURS PRN
Status: DISCONTINUED | OUTPATIENT
Start: 2020-12-08 | End: 2020-12-07 | Stop reason: HOSPADM

## 2020-12-05 RX ORDER — SODIUM CHLORIDE, SODIUM LACTATE, POTASSIUM CHLORIDE, CALCIUM CHLORIDE 600; 310; 30; 20 MG/100ML; MG/100ML; MG/100ML; MG/100ML
INJECTION, SOLUTION INTRAVENOUS CONTINUOUS PRN
Status: DISCONTINUED | OUTPATIENT
Start: 2020-12-05 | End: 2020-12-05

## 2020-12-05 RX ORDER — HYDROCHLOROTHIAZIDE 25 MG/1
25 TABLET ORAL DAILY
Status: DISCONTINUED | OUTPATIENT
Start: 2020-12-05 | End: 2020-12-07 | Stop reason: HOSPADM

## 2020-12-05 RX ORDER — DOXAZOSIN 4 MG/1
8 TABLET ORAL
Status: DISCONTINUED | OUTPATIENT
Start: 2020-12-06 | End: 2020-12-07 | Stop reason: HOSPADM

## 2020-12-05 RX ORDER — AMOXICILLIN 250 MG
1 CAPSULE ORAL 2 TIMES DAILY
Status: DISCONTINUED | OUTPATIENT
Start: 2020-12-05 | End: 2020-12-07 | Stop reason: HOSPADM

## 2020-12-05 RX ORDER — GABAPENTIN 100 MG/1
100 CAPSULE ORAL 3 TIMES DAILY
Status: DISCONTINUED | OUTPATIENT
Start: 2020-12-05 | End: 2020-12-05

## 2020-12-05 RX ORDER — KETAMINE HYDROCHLORIDE 10 MG/ML
INJECTION INTRAMUSCULAR; INTRAVENOUS PRN
Status: DISCONTINUED | OUTPATIENT
Start: 2020-12-05 | End: 2020-12-05

## 2020-12-05 RX ORDER — AMLODIPINE BESYLATE 10 MG/1
10 TABLET ORAL DAILY
Status: DISCONTINUED | OUTPATIENT
Start: 2020-12-06 | End: 2020-12-07 | Stop reason: HOSPADM

## 2020-12-05 RX ORDER — FENTANYL CITRATE 50 UG/ML
25-50 INJECTION, SOLUTION INTRAMUSCULAR; INTRAVENOUS
Status: DISCONTINUED | OUTPATIENT
Start: 2020-12-05 | End: 2020-12-05 | Stop reason: HOSPADM

## 2020-12-05 RX ORDER — GABAPENTIN 100 MG/1
100 CAPSULE ORAL AT BEDTIME
Status: DISCONTINUED | OUTPATIENT
Start: 2020-12-05 | End: 2020-12-07 | Stop reason: HOSPADM

## 2020-12-05 RX ORDER — EPHEDRINE SULFATE 50 MG/ML
INJECTION, SOLUTION INTRAMUSCULAR; INTRAVENOUS; SUBCUTANEOUS PRN
Status: DISCONTINUED | OUTPATIENT
Start: 2020-12-05 | End: 2020-12-05

## 2020-12-05 RX ORDER — CEFAZOLIN SODIUM 1 G/3ML
1 INJECTION, POWDER, FOR SOLUTION INTRAMUSCULAR; INTRAVENOUS SEE ADMIN INSTRUCTIONS
Status: DISCONTINUED | OUTPATIENT
Start: 2020-12-05 | End: 2020-12-05 | Stop reason: HOSPADM

## 2020-12-05 RX ORDER — BUPIVACAINE HYDROCHLORIDE AND EPINEPHRINE 5; 5 MG/ML; UG/ML
INJECTION, SOLUTION EPIDURAL; INTRACAUDAL; PERINEURAL PRN
Status: DISCONTINUED | OUTPATIENT
Start: 2020-12-05 | End: 2020-12-05 | Stop reason: HOSPADM

## 2020-12-05 RX ORDER — ONDANSETRON 2 MG/ML
4 INJECTION INTRAMUSCULAR; INTRAVENOUS EVERY 30 MIN PRN
Status: DISCONTINUED | OUTPATIENT
Start: 2020-12-05 | End: 2020-12-05 | Stop reason: HOSPADM

## 2020-12-05 RX ADMIN — CEFAZOLIN SODIUM 2 G: 2 INJECTION, SOLUTION INTRAVENOUS at 08:00

## 2020-12-05 RX ADMIN — POTASSIUM CHLORIDE AND SODIUM CHLORIDE: 900; 150 INJECTION, SOLUTION INTRAVENOUS at 15:20

## 2020-12-05 RX ADMIN — SUGAMMADEX 200 MG: 100 INJECTION, SOLUTION INTRAVENOUS at 12:48

## 2020-12-05 RX ADMIN — VECURONIUM BROMIDE 5 MG: 1 INJECTION, POWDER, LYOPHILIZED, FOR SOLUTION INTRAVENOUS at 09:45

## 2020-12-05 RX ADMIN — FENTANYL CITRATE 150 MCG: 50 INJECTION, SOLUTION INTRAMUSCULAR; INTRAVENOUS at 07:50

## 2020-12-05 RX ADMIN — ACETAMINOPHEN 975 MG: 325 TABLET, FILM COATED ORAL at 23:01

## 2020-12-05 RX ADMIN — Medication 30 MG: at 08:40

## 2020-12-05 RX ADMIN — PHENYLEPHRINE HYDROCHLORIDE 100 MCG: 10 INJECTION INTRAVENOUS at 08:00

## 2020-12-05 RX ADMIN — HYDROCHLOROTHIAZIDE 25 MG: 25 TABLET ORAL at 15:47

## 2020-12-05 RX ADMIN — Medication 10 MG: at 12:58

## 2020-12-05 RX ADMIN — SODIUM CHLORIDE, POTASSIUM CHLORIDE, SODIUM LACTATE AND CALCIUM CHLORIDE: 600; 310; 30; 20 INJECTION, SOLUTION INTRAVENOUS at 07:44

## 2020-12-05 RX ADMIN — Medication 5 MG: at 08:51

## 2020-12-05 RX ADMIN — HYDROMORPHONE HYDROCHLORIDE 0.5 MG: 1 INJECTION, SOLUTION INTRAMUSCULAR; INTRAVENOUS; SUBCUTANEOUS at 11:11

## 2020-12-05 RX ADMIN — ONDANSETRON 4 MG: 2 INJECTION INTRAMUSCULAR; INTRAVENOUS at 12:44

## 2020-12-05 RX ADMIN — POLYETHYLENE GLYCOL 3350 17 G: 17 POWDER, FOR SOLUTION ORAL at 15:47

## 2020-12-05 RX ADMIN — DOCUSATE SODIUM 50 MG AND SENNOSIDES 8.6 MG 2 TABLET: 8.6; 5 TABLET, FILM COATED ORAL at 20:58

## 2020-12-05 RX ADMIN — CEFAZOLIN SODIUM 2 G: 2 INJECTION, SOLUTION INTRAVENOUS at 20:57

## 2020-12-05 RX ADMIN — ROCURONIUM BROMIDE 50 MG: 10 INJECTION INTRAVENOUS at 07:50

## 2020-12-05 RX ADMIN — ACETAMINOPHEN 975 MG: 325 TABLET, FILM COATED ORAL at 15:47

## 2020-12-05 RX ADMIN — HYDROMORPHONE HYDROCHLORIDE 0.5 MG: 1 INJECTION, SOLUTION INTRAMUSCULAR; INTRAVENOUS; SUBCUTANEOUS at 09:45

## 2020-12-05 RX ADMIN — Medication: at 14:30

## 2020-12-05 RX ADMIN — CEFAZOLIN SODIUM 1 G: 2 INJECTION, SOLUTION INTRAVENOUS at 09:55

## 2020-12-05 RX ADMIN — LIDOCAINE HYDROCHLORIDE 60 MG: 20 INJECTION, SOLUTION INFILTRATION; PERINEURAL at 07:50

## 2020-12-05 RX ADMIN — GABAPENTIN 100 MG: 100 CAPSULE ORAL at 21:01

## 2020-12-05 RX ADMIN — PROPOFOL 200 MG: 10 INJECTION, EMULSION INTRAVENOUS at 07:50

## 2020-12-05 RX ADMIN — VECURONIUM BROMIDE 5 MG: 1 INJECTION, POWDER, LYOPHILIZED, FOR SOLUTION INTRAVENOUS at 11:00

## 2020-12-05 RX ADMIN — Medication 5 MG: at 10:48

## 2020-12-05 RX ADMIN — VECURONIUM BROMIDE 5 MG: 1 INJECTION, POWDER, LYOPHILIZED, FOR SOLUTION INTRAVENOUS at 08:39

## 2020-12-05 RX ADMIN — CEFAZOLIN SODIUM 1 G: 2 INJECTION, SOLUTION INTRAVENOUS at 11:49

## 2020-12-05 RX ADMIN — DEXAMETHASONE SODIUM PHOSPHATE 8 MG: 4 INJECTION, SOLUTION INTRA-ARTICULAR; INTRALESIONAL; INTRAMUSCULAR; INTRAVENOUS; SOFT TISSUE at 08:30

## 2020-12-05 RX ADMIN — SODIUM CHLORIDE, POTASSIUM CHLORIDE, SODIUM LACTATE AND CALCIUM CHLORIDE: 600; 310; 30; 20 INJECTION, SOLUTION INTRAVENOUS at 09:54

## 2020-12-05 RX ADMIN — DEXMEDETOMIDINE HYDROCHLORIDE 0.3 MCG/KG/HR: 100 INJECTION, SOLUTION INTRAVENOUS at 08:00

## 2020-12-05 RX ADMIN — PHENYLEPHRINE HYDROCHLORIDE 0.25 MCG/KG/MIN: 10 INJECTION INTRAVENOUS at 08:05

## 2020-12-05 RX ADMIN — SODIUM CHLORIDE, SODIUM LACTATE, POTASSIUM CHLORIDE, CALCIUM CHLORIDE: 600; 310; 30; 20 INJECTION, SOLUTION INTRAVENOUS at 08:00

## 2020-12-05 RX ADMIN — FAMOTIDINE 20 MG: 20 TABLET ORAL at 20:58

## 2020-12-05 RX ADMIN — PHENYLEPHRINE HYDROCHLORIDE 100 MCG: 10 INJECTION INTRAVENOUS at 08:51

## 2020-12-05 ASSESSMENT — MIFFLIN-ST. JEOR: SCORE: 1776.91

## 2020-12-05 ASSESSMENT — ACTIVITIES OF DAILY LIVING (ADL)
ADLS_ACUITY_SCORE: 16
ADLS_ACUITY_SCORE: 16

## 2020-12-05 NOTE — CONSULTS
[facility]    Hospitalist Consultation    Date of Admission:  12/5/2020    Assessment & Plan   Subhash Cantu is a 65 year old male who was admitted on 12/5/2020. I was asked to see the patient for post-op medical management.    Summary: Subhash Cantu is a 65 year old male with PMH chronic lower back pain with radiculopathy, impaired fasting glucose, BPH, HTN, who was admitted on 12/5/2020 for lumbar fusion. Hospitalist service consulted 12/5 for medical management.    Chronic lower back pain with radiculopathy, s/p L3-4, L4-5, L5-S1 fusion on 12/5/2020  Neurosurgery is primary. There was no intra-operative complications. They are managing post-op surgical issues including pain  - Continue IVF  - Decrease gabapentin to 100mg at bedtime as radiculopathy is expected to improve  - Check BMP and CBC tomorrow    Hypertension: Stable here 115/63  - Continue PTA amlodipine 10mg daily, hydrochlorothiazide 25mg daily    Impaired fasting glucose: Check A1c here    BPH: Chronic and stable on doxazosin, continue here    DVT Prophylaxis: Pneumatic Compression Devices  Code Status: Full Code    Disposition: Expected discharge per neurosurgical service. Patient appears medically stable to discharge.    Jaguar Mercer MD    Reason for Consult   Reason for consult: I was asked by NS to evaluate this patient for medical management.    Primary Care Physician   Abhinav Hudson    Chief Complaint   None    History is obtained from the patient    History of Present Illness   Subhash Cantu is a 65 year old male who is status post L3-4, L4-5, and L5-S1 vertebral fusions. He currently reports low back pain with most movements. The pain is adequately controlled currently. He reports he takes gabapentin three times daily for his radiculopathy. He has no other concerns at this time.    Past Medical History    I have reviewed this patient's medical history and updated it with pertinent information if needed.    Past Medical History:   Diagnosis Date     Depression      Diverticulosis     sigmoid     Hypertension      Impaired fasting glucose      Rhinitis        Past Surgical History   I have reviewed this patient's surgical history and updated it with pertinent information if needed.  Past Surgical History:   Procedure Laterality Date     BACK SURGERY      lumbar laminectomy     COLONOSCOPY       HERNIA REPAIR      bilateral inguinal, umbilical     ORTHOPEDIC SURGERY Bilateral     knee arthroscopy     STAPEDECTOMY       TONSILLECTOMY & ADENOIDECTOMY         Prior to Admission Medications   Prior to Admission Medications   Prescriptions Last Dose Informant Patient Reported? Taking?   AmLODIPine Besylate (NORVASC PO) 12/5/2020 at Unknown time  Yes Yes   Sig: Take 10 mg by mouth daily    Ascorbic Acid (VITAMIN C PO) 12/3/2020  Yes Yes   Ascorbic Acid (VITAMIN C) 500 MG CAPS   Yes Yes   Sig: Take by mouth daily   Calcium Carb-Cholecalciferol (CALCIUM 600 + D PO) Past Week at Unknown time  Yes Yes   Sig: Take by mouth 2 times daily   Multiple Vitamins-Minerals (MULTIVITAMIN ADULT PO) Past Week at Unknown time  Yes Yes   Sig: Take by mouth daily    OXYCODONE HCL PO 12/4/2020 at Unknown time  Yes Yes   Sig: Take 10 mg by mouth every 4 hours as needed    TRAZODONE HCL PO 12/4/2020 at Unknown time  Yes Yes   Sig: Take 150 mg by mouth nightly as needed    Vitamin D, Cholecalciferol, 1000 units CAPS Past Week at Unknown time  Yes Yes   Sig: Take by mouth daily    aspirin 81 MG EC tablet 12/3/2020  Yes Yes   Sig: Take 81 mg by mouth daily   baclofen (LIORESAL) 10 MG tablet 12/4/2020  Yes Yes   Sig: Take 10 mg by mouth 3 times daily as needed for muscle spasms   doxazosin (CARDURA) 8 MG tablet 12/5/2020 at Unknown time  Yes Yes   Sig: Take 8 mg by mouth daily (with breakfast)    fish oil-omega-3 fatty acids 1000 MG capsule Past Week at Unknown time  Yes Yes   Sig: Take 2 g by mouth daily   gabapentin (GRALISE) 300 MG 24 hr tablet  12/5/2020 at Unknown time  Yes Yes   Sig: Take 300 mg by mouth daily (with dinner) 2 am, 3 mid day, 3 at bed   glucosamine-chondroitin 500-400 MG CAPS per capsule Past Week at Unknown time  Yes Yes   Sig: Take 1 capsule by mouth daily   hydrochlorothiazide (HYDRODIURIL) 25 MG tablet 12/4/2020 at Unknown time  Yes Yes   Sig: Take 25 mg by mouth daily    psyllium (METAMUCIL/KONSYL) 58.6 % powder 12/4/2020 at Unknown time  Yes Yes   Sig: Take by mouth daily      Facility-Administered Medications: None     Allergies   No Known Allergies    Social History   I have reviewed this patient's social history and updated it with pertinent information if needed. Subhash Cantu  reports that he quit smoking about 18 years ago. His smoking use included cigarettes. He has never used smokeless tobacco. He reports previous alcohol use. He reports that he does not use drugs.    Family History   I have reviewed this patient's family history and updated it with pertinent information if needed.   History reviewed. No pertinent family history.    Review of Systems   The 10 point Review of Systems is negative other than noted in the HPI or here.    Physical Exam   Temp: 98  F (36.7  C) Temp src: Oral BP: 115/63 Pulse: 62   Resp: 10 SpO2: 95 % O2 Device: Nasal cannula Oxygen Delivery: 2 LPM  Vital Signs with Ranges  Temp:  [97  F (36.1  C)-98  F (36.7  C)] 98  F (36.7  C)  Pulse:  [60-82] 62  Resp:  [10-16] 10  BP: ()/(53-73) 115/63  SpO2:  [95 %-99 %] 95 %  225 lbs 6.4 oz    Constitutional: Male in NAD  HEENT: Eyes nonicteric, oral mucosa moist  Cardiovascular: RRR, normal S1/2, no m/r/g  Respiratory: CTAB, no wheezing or crackles  Vascular: No LE pitting edema  GI: Normoactive bowel sounds, nontender, nondistended  Skin/Integumen: No rashes, back was not examined  Neuro/Psych: Appropriate affect and mood. A&Ox3, moves all extremities    Data   -Data reviewed today: All pertinent laboratory and imaging results from this encounter  were reviewed. I personally reviewed imaging this admission.  No lab results found in last 7 days.    Imaging:  Recent Results (from the past 24 hour(s))   XR Lumbar Spine Port 1 View    Narrative    LUMBAR SPINE PORTABLE, TWO VIEWS   12/5/2020 8:57 AM     HISTORY: Surgery. Intraoperative localization.    COMPARISON: None.      Impression    IMPRESSION: Five lumbar vertebral bodies are presumed. Two portable  lateral exams were obtained for localization purposes. Exam at 0835  shows a needle projecting towards the L1-L2 interspace. Exam at 0836  shows a needle projecting towards the superior aspect of the L3  vertebral body.    SHRADDHA ALDRIDGE MD   XR Lumbar Spine Port 1 View    Narrative    LUMBAR SPINE PORTABLE ONE VIEW   12/5/2020 10:15 AM     HISTORY: L3-S1 transforaminal lumbar interbody fusion.    COMPARISON: 0836 on same date.      Impression    IMPRESSION: Portable lateral intraoperative view shows transpedicular  screws at L3, L4, L5, and S1. Vertebral body heights appear  maintained. Alignment appears normal.    SHRADDHA ALDRIDGE MD   XR Lumbar Spine Port 1 View    Narrative    LUMBAR SPINE PORTABLE ONE VIEW   12/5/2020 12:01 PM     HISTORY: Intraoperative cross table lumbar spine.    COMPARISON: 1001 on the same date.      Impression    IMPRESSION: Intraoperative crosstable lateral spine shows new anterior  fusion devices projecting within the L3-L4, L4-L5, and L5-S1 disc  spaces. Transpedicular screws with instrumentation rods also noted.  Posterior alignment is normal.    SHRADDHA ALDRIDGE MD

## 2020-12-05 NOTE — OP NOTE
OPERATIVE REPORT       PREOPERATIVE DIAGNOSIS:   1. Previous L4-5 microdiscectomy in 1989 at outside hospital  2. Chronic intractable low back pain  3. BLE radiculopathy left > right  4. Multilevel positive lumbar discogram     POSTOPERATIVE DIAGNOSIS: Same.     PROCEDURE:   1. Reopening of previous incision with exposure of L3-S1 posterior elements  2. Left L3-4 transforaminal lumbar interbody fusion (complete left laminectomy with complete left facetectomy with discectomy, arthrodesis and transforaminal interbody fusion with placement of a 9 x 26 mm Globus Caliber expandable intrebody graft with autologous bone placed centrally and anteriorly)  3. Redo left L4-5 transforaminal lumbar interbody fusion (complete left laminectomy with complete left facetectomy with discectomy, arthrodesis and transforaminal interbody fusion with placement of a 7 x 26 mm Globus Caliber expandable intrebody graft with autologous bone placed centrally and anteriorly)  4. Left L5-S1 transforaminal lumbar interbody fusion (complete left laminectomy with complete left facetectomy with discectomy, arthrodesis and transforaminal interbody fusion with placement of a 7 x 26 mm Globus Caliber expandable intrebody graft with autologous bone placed centrally and anteriorly)  5. Placement of Globus Creo ANDRADE pedicle screws from L3-S1 bilaterally   6. Posterior lateral fusion from L3-S1 with locally harvested autologous bone, crushed cancellous bone and Globus Signify  7. Use of microscope and portable X-ray  8. Use of Stealth and O-arm intraoperative neuronavigation    Surgeon: Valdez Bradford MD    ASSISTANT: Nicole Leslie CNP and ST Castro whose assistance was required throughout the case for positioning, exposure, retraction/suctioning during decompression, implant hardware placement, wound closure and transferred to postoperative bed.    INDICATION FOR PROCEDURE: The patient is a 65 year old male that presented with above  symptoms. He had a previous L4-5 microdiscectomy in 1989 and has developed BLE radiculopathy left > right and intractable mechanical low back pain. He also has a multilevel positive discogram. After reviewing the examination and imaging studies, the decision was made to proceed with the above procedure. The patient understood the risks of surgery to be infection, CSF leak, nerve root injury, failure of hardware, the need for recurrent surgery, epidural fibrosis, weakness, coma and death. The patient voiced understanding and wanted to proceed.     DESCRIPTION OF PROCEDURE: The patient was seen in the pre op area and the procedure was discussed with the patient once again and all questions were answered. The consent was then signed and the lumbar spine was marked with a marker. The patient was transported to the operating room on a stretcher and received general endotracheal anesthesia and a Chavez catheter was placed. The patient was placed on the operating table in the prone position on the Edwin frame with all pressure points padded. The back was then prepped and draped in a normal sterile fashion. C-arm used to identify the appropriate level. Local anesthesia was injected along the planned incision with 10 blade scalpel used to reopen the midline incision with dissection down through the subcutaneous tissue to the fascia. The fascia was reopened bilaterally with the Bovie cautery. Subperiosteal dissection was used to expose the lamina facet joint and transverse processes from L3-S1. The Versa trac retractor was then placed to retract the paraspinous muscles. The operating microscope was then brought into the field and attention then turned to the decompression where a left L3-4, redo L4-5 and L5-S1 laminectomies with complete facetectomies was performed with the [employer]as Ortiz drill, the Kerrison rongeur and the pituitary rongeur which were used to remove the left lamina and facet joints. This completely decompressed  and exposed the exiting roots bilaterally and the traversing roots bilaterally in Kambin's triangle. Following that, the disk spaces were incised with the 11 blade knife after retracting the thecal sac and nerve root medially. The disks were removed with the pituitary rongeur and the endplate andrew from size 7-10 mm. The nerves were thoroughly decompressed. Next, three Globus Caliber expandable interbody grafts were placed in the L3-4, L4-5 and L5-S1 interspaces with autologous bone placed both anteriorly and centrally inside the graft. The expandable interbody grafts were then expanded to the appropriate sizes. The Stealth and O-arm were brought in for intraoperative pedicle screw placement and the pedicle screws were placed using the normal technique of a  hole with the [employer] Ortiz drill, the gear shift probe to penetrate the pedicle and the 5.5 mm tap to tap the pedicle. The pedicle screws were then navigated down the pedicles from at L3-S1 bilaterally. The connecting rods were secured from L3-S1 and the locking caps were secured in place with the counter torque system. Copious irrigation was performed with saline and 5 ml of Vistacele was placed over the dura. The wound was irrigated once again and the retractors were removed after decortication of the lateral facet and transverse process was performed from L3-S1 bilaterally and  locally harvested autologous bone, crushed cancellous bone and Globus Signify were then placed out laterally for the posterolateral fusion. Two Edwin-Buitrago drains were left subfascially. The fascia was closed with 0 Vicryl, the subcutaneous tissue closed with 2 - 0 and 3-0 Vicryl, and the skin closed with staples. The patient was then transported back to the stretcher, extubated, and sent to recovery.     At the end of the case, all counts were correct    Complications: None    Estimated blood loss 175 ml     IV fluids: See Anesthesia    The patient received Ancef preoperatively  and intraoperatively and Vancomycin powder in the wound       Valdez Bradford MD, MS, FAANS

## 2020-12-05 NOTE — ANESTHESIA CARE TRANSFER NOTE
Patient: Subhash Cantu    Procedure(s):  LUMBAR 3 TO SACRAL 1 TRANSFORAMINAL LUMBAR INTERBODY FUSION    Diagnosis: Neuritis or radiculitis due to rupture of lumbar intervertebral disc [M51.16]  Sciatic radiculitis [M51.17]  Diagnosis Additional Information: No value filed.    Anesthesia Type:   General     Note:  Airway :Face Mask  Patient transferred to:PACU  Comments: Neuromuscular blockade reversed after TOF 4/4, spontaneous respirations, adequate tidal volumes, followed commands to voice, oropharynx suctioned with soft flexible catheter, extubated atraumatically, extubated with suction, airway patent after extubation.  Oxygen via facemask at 4 liters per minute to PACU. After extubation, patient remained in OR for 14 minutes until transport to PACU due to standard hospital COVID-19 precautions, Oxygen tubing connected to wall O2 in PACU, SpO2, NiBP, and EKG monitors and alarms on and functioning, Cameron Hugger warmer connected to patient gown.   Handoff Report: Identifed the Patient, Identified the Reponsible Provider, Reviewed the pertinent medical history, Discussed the surgical course, Reviewed Intra-OP anesthesia mangement and issues during anesthesia, Set expectations for post-procedure period and Allowed opportunity for questions and acknowledgement of understanding      Vitals: (Last set prior to Anesthesia Care Transfer)    CRNA VITALS  12/5/2020 1242 - 12/5/2020 1322      12/5/2020             Resp Rate (set):  10                Electronically Signed By: JACOBY Nicholas CRNA  December 5, 2020  1:22 PM

## 2020-12-05 NOTE — ANESTHESIA POSTPROCEDURE EVALUATION
Patient: Subhash Cantu    Procedure(s):  LUMBAR 3 TO SACRAL 1 TRANSFORAMINAL LUMBAR INTERBODY FUSION    Diagnosis:Neuritis or radiculitis due to rupture of lumbar intervertebral disc [M51.16]  Sciatic radiculitis [M51.17]  Diagnosis Additional Information: No value filed.    Anesthesia Type:  General    Note:  Anesthesia Post Evaluation    Patient location during evaluation: PACU  Patient participation: Able to fully participate in evaluation  Level of consciousness: awake, awake and alert and responsive to verbal stimuli  Pain management: adequate  Airway patency: patent  Cardiovascular status: acceptable  Respiratory status: acceptable  Hydration status: acceptable  PONV: none     Anesthetic complications: None          Last vitals:  Vitals:    12/05/20 1430 12/05/20 1500 12/05/20 1538   BP: 103/53 115/63 113/61   Pulse: 60 62 62   Resp: 11 10 14   Temp: 36.7  C (98  F)  36.4  C (97.5  F)   SpO2: 97% 95% 96%         Electronically Signed By: Fiona Ken  December 5, 2020  4:39 PM

## 2020-12-05 NOTE — PROGRESS NOTES
Vitals stable. PCA controlling pain.Tolerated clear liquids without nausea. CMS intact. Denies numbness or tingling. Bowel sounds present. Back dressing clean, dry, intact. Hemovac x 2 with bloody drainage-minimal output..Stable post-op course.

## 2020-12-05 NOTE — ANESTHESIA PROCEDURE NOTES
Airway   Date/Time: 12/5/2020 7:54 AM   Patient location during procedure: OR    Staff -   Anesthesiologist:  Fiona Ken  CRNA: Xochitl Jara APRN CRNA  Performed By: CRNA    Indications and Patient Condition  Indications for airway management: william-procedural  Induction type:intravenousMask difficulty assessment: 1 - vent by mask    Final Airway Details  Final airway type: endotracheal airway  Successful airway:ETT - single  Endotracheal Airway Details   ETT size (mm): 8.0  Cuffed: yes  Cuff volume (mL): 10  Successful intubation technique: direct laryngoscopy  Grade View of Cords: 2  Measured from: gums/teeth  Secured at (cm): 23  Secured with: pink tape  Bite block used: Soft    Post intubation assessment   Placement verified by: capnometry, equal breath sounds and chest rise   Number of attempts at approach: 1  Number of other approaches attempted: 0  Secured with:pink tape  Ease of procedure: easy  Dentition: Intact

## 2020-12-05 NOTE — ANESTHESIA PREPROCEDURE EVALUATION
Anesthesia Pre-Procedure Evaluation    Patient: Subhash Cantu   MRN: [id number] : 1955          Preoperative Diagnosis: Neuritis or radiculitis due to rupture of lumbar intervertebral disc [M51.16]  Sciatic radiculitis [M51.17]    Procedure(s):  LUMBAR 3 TO SACRAL 1 TRANSFORAMINAL LUMBAR INTERBODY FUSION WITH POSSIBLE EXTENSION TO LUMBAR 2 TO LUMBAR 3 , USING OPTICAL TRACKING SYSTEM    Past Medical History:   Diagnosis Date     Depression      Diverticulosis     sigmoid     Hypertension      Impaired fasting glucose      Rhinitis      Past Surgical History:   Procedure Laterality Date     BACK SURGERY      lumbar laminectomy     COLONOSCOPY       HERNIA REPAIR      bilateral inguinal, umbilical     ORTHOPEDIC SURGERY Bilateral     knee arthroscopy     STAPEDECTOMY       TONSILLECTOMY & ADENOIDECTOMY         Anesthesia Evaluation     .             ROS/MED HX    ENT/Pulmonary:     (+)allergic rhinitis, , . .    Neurologic:     (+)neuropathy     Cardiovascular:     (+) hypertension----. : . . . :. .       METS/Exercise Tolerance:  >4 METS   Hematologic:         Musculoskeletal:         GI/Hepatic:        (-) GERD   Renal/Genitourinary:     (+) BPH,       Endo:     (+) Obesity, .      Psychiatric:         Infectious Disease:         Malignancy:         Other:                          Physical Exam      Airway   Mallampati: III  TM distance: >3 FB  Neck ROM: full    Dental   (+) chipped    Cardiovascular   Rhythm and rate: regular      Pulmonary    breath sounds clear to auscultation            No results found for: WBC, HGB, HCT, PLT, CRP, SED, NA, POTASSIUM, CHLORIDE, CO2, BUN, CR, GLC, ART, PHOS, MAG, ALBUMIN, PROTTOTAL, ALT, AST, GGT, ALKPHOS, BILITOTAL, BILIDIRECT, LIPASE, AMYLASE, TREVER, PTT, INR, FIBR, TSH, T4, T3, HCG, HCGS, CKTOTAL, CKMB, TROPN    Preop Vitals  BP Readings from Last 3 Encounters:   20 (!) 149/73   18 144/84   18 121/78    Pulse Readings from Last 3 Encounters:  "  12/03/18 76   11/05/18 67      Resp Readings from Last 3 Encounters:   12/05/20 16    SpO2 Readings from Last 3 Encounters:   12/03/18 92%   11/05/18 93%      Temp Readings from Last 1 Encounters:   12/05/20 36.7  C (98  F) (Temporal)    Ht Readings from Last 1 Encounters:   12/05/20 1.727 m (5' 8\")      Wt Readings from Last 1 Encounters:   12/05/20 102.2 kg (225 lb 6.4 oz)    Estimated body mass index is 34.27 kg/m  as calculated from the following:    Height as of this encounter: 1.727 m (5' 8\").    Weight as of this encounter: 102.2 kg (225 lb 6.4 oz).       Anesthesia Plan      History & Physical Review  History and physical reviewed and following examination; no interval change.    ASA Status:  2 .    NPO Status:  > 8 hours    Plan for General   PONV prophylaxis:  Ondansetron (or other 5HT-3) and Dexamethasone or Solumedrol  Additional equipment: Videolaryngoscope Low dose precedex gtt         Postoperative Care      Consents  Anesthetic plan, risks, benefits and alternatives discussed with:  Patient..                 Fiona Ken  "

## 2020-12-06 ENCOUNTER — APPOINTMENT (OUTPATIENT)
Dept: PHYSICAL THERAPY | Facility: CLINIC | Age: 65
End: 2020-12-06
Attending: NEUROLOGICAL SURGERY
Payer: COMMERCIAL

## 2020-12-06 ENCOUNTER — APPOINTMENT (OUTPATIENT)
Dept: GENERAL RADIOLOGY | Facility: CLINIC | Age: 65
End: 2020-12-06
Attending: NEUROLOGICAL SURGERY
Payer: COMMERCIAL

## 2020-12-06 LAB
ANION GAP SERPL CALCULATED.3IONS-SCNC: 2 MMOL/L (ref 3–14)
BUN SERPL-MCNC: 11 MG/DL (ref 7–30)
CALCIUM SERPL-MCNC: 8.3 MG/DL (ref 8.5–10.1)
CHLORIDE SERPL-SCNC: 109 MMOL/L (ref 94–109)
CO2 SERPL-SCNC: 31 MMOL/L (ref 20–32)
CREAT SERPL-MCNC: 0.76 MG/DL (ref 0.66–1.25)
ERYTHROCYTE [DISTWIDTH] IN BLOOD BY AUTOMATED COUNT: 13.9 % (ref 10–15)
GFR SERPL CREATININE-BSD FRML MDRD: >90 ML/MIN/{1.73_M2}
GLUCOSE SERPL-MCNC: 121 MG/DL (ref 70–99)
HCT VFR BLD AUTO: 38.9 % (ref 40–53)
HGB BLD-MCNC: 12.9 G/DL (ref 13.3–17.7)
MCH RBC QN AUTO: 29.7 PG (ref 26.5–33)
MCHC RBC AUTO-ENTMCNC: 33.2 G/DL (ref 31.5–36.5)
MCV RBC AUTO: 90 FL (ref 78–100)
PLATELET # BLD AUTO: 149 10E9/L (ref 150–450)
POTASSIUM SERPL-SCNC: 4 MMOL/L (ref 3.4–5.3)
RBC # BLD AUTO: 4.34 10E12/L (ref 4.4–5.9)
SODIUM SERPL-SCNC: 142 MMOL/L (ref 133–144)
WBC # BLD AUTO: 12.6 10E9/L (ref 4–11)

## 2020-12-06 PROCEDURE — 250N000011 HC RX IP 250 OP 636: Performed by: NEUROLOGICAL SURGERY

## 2020-12-06 PROCEDURE — 999N000179 XR SURGERY CARM FLUORO LESS THAN 5 MIN W STILLS

## 2020-12-06 PROCEDURE — 80048 BASIC METABOLIC PNL TOTAL CA: CPT | Performed by: INTERNAL MEDICINE

## 2020-12-06 PROCEDURE — 97530 THERAPEUTIC ACTIVITIES: CPT | Mod: GP

## 2020-12-06 PROCEDURE — 85027 COMPLETE CBC AUTOMATED: CPT | Performed by: INTERNAL MEDICINE

## 2020-12-06 PROCEDURE — 120N000001 HC R&B MED SURG/OB

## 2020-12-06 PROCEDURE — 250N000013 HC RX MED GY IP 250 OP 250 PS 637: Performed by: INTERNAL MEDICINE

## 2020-12-06 PROCEDURE — 36415 COLL VENOUS BLD VENIPUNCTURE: CPT | Performed by: INTERNAL MEDICINE

## 2020-12-06 PROCEDURE — 250N000013 HC RX MED GY IP 250 OP 250 PS 637: Performed by: NURSE PRACTITIONER

## 2020-12-06 PROCEDURE — 99232 SBSQ HOSP IP/OBS MODERATE 35: CPT | Performed by: INTERNAL MEDICINE

## 2020-12-06 PROCEDURE — 250N000013 HC RX MED GY IP 250 OP 250 PS 637: Performed by: NEUROLOGICAL SURGERY

## 2020-12-06 PROCEDURE — 97116 GAIT TRAINING THERAPY: CPT | Mod: GP

## 2020-12-06 PROCEDURE — 97161 PT EVAL LOW COMPLEX 20 MIN: CPT | Mod: GP

## 2020-12-06 RX ORDER — HYDROMORPHONE HYDROCHLORIDE 2 MG/1
2-4 TABLET ORAL EVERY 6 HOURS PRN
Qty: 42 TABLET | Refills: 0 | Status: SHIPPED | OUTPATIENT
Start: 2020-12-06 | End: 2020-12-09

## 2020-12-06 RX ORDER — ASPIRIN 81 MG/1
81 TABLET ORAL DAILY
Qty: 1 TABLET | Refills: 0
Start: 2020-12-09

## 2020-12-06 RX ORDER — HYDROMORPHONE HYDROCHLORIDE 2 MG/1
2-4 TABLET ORAL
Status: DISCONTINUED | OUTPATIENT
Start: 2020-12-06 | End: 2020-12-07 | Stop reason: HOSPADM

## 2020-12-06 RX ADMIN — HYDROMORPHONE HYDROCHLORIDE 4 MG: 2 TABLET ORAL at 23:15

## 2020-12-06 RX ADMIN — HYDROCHLOROTHIAZIDE 25 MG: 25 TABLET ORAL at 08:51

## 2020-12-06 RX ADMIN — DOCUSATE SODIUM 50 MG AND SENNOSIDES 8.6 MG 2 TABLET: 8.6; 5 TABLET, FILM COATED ORAL at 20:42

## 2020-12-06 RX ADMIN — CEFAZOLIN SODIUM 2 G: 2 INJECTION, SOLUTION INTRAVENOUS at 12:29

## 2020-12-06 RX ADMIN — DOCUSATE SODIUM 50 MG AND SENNOSIDES 8.6 MG 2 TABLET: 8.6; 5 TABLET, FILM COATED ORAL at 08:51

## 2020-12-06 RX ADMIN — TRAZODONE HYDROCHLORIDE 150 MG: 50 TABLET ORAL at 21:05

## 2020-12-06 RX ADMIN — CEFAZOLIN SODIUM 2 G: 2 INJECTION, SOLUTION INTRAVENOUS at 20:42

## 2020-12-06 RX ADMIN — DOXAZOSIN 8 MG: 4 TABLET ORAL at 08:49

## 2020-12-06 RX ADMIN — POTASSIUM CHLORIDE AND SODIUM CHLORIDE: 900; 150 INJECTION, SOLUTION INTRAVENOUS at 01:05

## 2020-12-06 RX ADMIN — CEFAZOLIN SODIUM 2 G: 2 INJECTION, SOLUTION INTRAVENOUS at 04:05

## 2020-12-06 RX ADMIN — ACETAMINOPHEN 975 MG: 325 TABLET, FILM COATED ORAL at 23:15

## 2020-12-06 RX ADMIN — GABAPENTIN 100 MG: 100 CAPSULE ORAL at 21:02

## 2020-12-06 RX ADMIN — FAMOTIDINE 20 MG: 20 TABLET ORAL at 20:42

## 2020-12-06 RX ADMIN — ACETAMINOPHEN 975 MG: 325 TABLET, FILM COATED ORAL at 06:34

## 2020-12-06 RX ADMIN — POLYETHYLENE GLYCOL 3350 17 G: 17 POWDER, FOR SOLUTION ORAL at 08:52

## 2020-12-06 RX ADMIN — AMLODIPINE BESYLATE 10 MG: 10 TABLET ORAL at 08:50

## 2020-12-06 RX ADMIN — FAMOTIDINE 20 MG: 20 TABLET ORAL at 08:49

## 2020-12-06 RX ADMIN — ACETAMINOPHEN 975 MG: 325 TABLET, FILM COATED ORAL at 15:15

## 2020-12-06 RX ADMIN — HYDROMORPHONE HYDROCHLORIDE 4 MG: 2 TABLET ORAL at 12:27

## 2020-12-06 RX ADMIN — HYDROMORPHONE HYDROCHLORIDE 4 MG: 2 TABLET ORAL at 15:15

## 2020-12-06 RX ADMIN — HYDROMORPHONE HYDROCHLORIDE 4 MG: 2 TABLET ORAL at 18:20

## 2020-12-06 ASSESSMENT — ACTIVITIES OF DAILY LIVING (ADL)
ADLS_ACUITY_SCORE: 18

## 2020-12-06 NOTE — PROGRESS NOTES
[facility]    Hospitalist Progress Note    Interval History   - Pain adequately controlled. No acute complaints this morning.    Assessment & Plan   Summary:  Subhash Cantu is a 65 year old male with PMH chronic lower back pain with radiculopathy, impaired fasting glucose, BPH, HTN, who was admitted on 12/5/2020 for lumbar fusion. Hospitalist service consulted 12/5 for medical management.     Chronic lower back pain with radiculopathy, s/p L3-4, L4-5, L5-S1 fusion on 12/5/2020  Neurosurgery is primary. There was no intra-operative complications. EBL 175ml. They are managing post-op surgical issues including pain  - Neurosurgery post-op management   - Remains on pain PCA   - Drains still in place  - Decrease gabapentin to 100mg at bedtime as radiculopathy is expected to improve    Hematuria: Appears to be related to traumatic ndiaye insertion. No blood clots appreciated.  - Urology consulted by NSG team  - Consider trial to void     Hypertension: Stable here 142/75  - Continue PTA amlodipine 10mg daily, hydrochlorothiazide 25mg daily     Prediabetes: A1c 5.8% this admission. Discussed dietary modifications, weight loss as long term strategies to DM2 risk.  - BID glucose checks     BPH: Chronic and stable on doxazosin, continue here    DVT Prophylaxis: Pneumatic Compression Devices  Code Status: Full Code  PT/OT: ordered  Diet: Advance Diet as Tolerated: Regular Diet Adult  Diet      Disposition: Expected discharge per Neurosurgery    Jaguar Mercer MD  Text Page  (7am to 6pm)  -Data reviewed today: I reviewed all new labs and imaging results over the last 24 hours.    Physical Exam   Temp: 98.8  F (37.1  C) Temp src: Oral BP: (!) 142/75 Pulse: 68   Resp: 16 SpO2: 96 % O2 Device: None (Room air) Oxygen Delivery: 2 LPM  Vitals:    11/25/20 1419 12/05/20 0555   Weight: 101.2 kg (223 lb) 102.2 kg (225 lb 6.4 oz)     Vital Signs with Ranges  Temp:  [97  F (36.1  C)-98.8  F (37.1  C)] 98.8   F (37.1  C)  Pulse:  [60-82] 68  Resp:  [10-16] 16  BP: ()/(53-76) 142/75  SpO2:  [95 %-99 %] 96 %  I/O last 3 completed shifts:  In: 1930 [I.V.:1930]  Out: 3100 [Urine:2650; Drains:275; Blood:175]  O2 requirements: PCO2 monitoring    Constitutional: Male in NAD  HEENT: Eyes nonicteric, oral mucosa moist  Cardiovascular: RRR, normal S1/2, no m/r/g  Respiratory: CTAB, no wheezing or crackles  Vascular: No LE pitting edema  GI: Normoactive bowel sounds, nontender, nondistended  Skin/Integumen: No rashes, back drains noted  Neuro/Psych: Appropriate affect and mood. A&Ox3, moves all extremities    Medications     0.9% sodium chloride + KCl 20 mEq/L 100 mL/hr at 12/06/20 0105       acetaminophen  975 mg Oral Q8H     amLODIPine  10 mg Oral Daily     ceFAZolin  2 g Intravenous Q8H     doxazosin  8 mg Oral Daily with breakfast     famotidine  20 mg Oral BID    Or     famotidine  20 mg Intravenous BID     gabapentin  100 mg Oral At Bedtime     hydrochlorothiazide  25 mg Oral Daily     polyethylene glycol  17 g Oral Daily     senna-docusate  1 tablet Oral BID    Or     senna-docusate  2 tablet Oral BID     senna-docusate  1 tablet Oral BID    Or     senna-docusate  2 tablet Oral BID     sodium chloride (PF)  3 mL Intracatheter Q8H       Data   Recent Labs   Lab 12/06/20  0848 12/05/20  1953   WBC 12.6*  --    HGB 12.9*  --    MCV 90  --    *  --      --    POTASSIUM 4.0  --    CHLORIDE 109  --    CO2 31  --    BUN 11  --    CR 0.76 0.80   ANIONGAP 2*  --    ART 8.3*  --    *  --        Imaging:   Recent Results (from the past 24 hour(s))   XR Lumbar Spine Port 1 View    Narrative    LUMBAR SPINE PORTABLE ONE VIEW   12/5/2020 12:01 PM     HISTORY: Intraoperative cross table lumbar spine.    COMPARISON: 1001 on the same date.      Impression    IMPRESSION: Intraoperative crosstable lateral spine shows new anterior  fusion devices projecting within the L3-L4, L4-L5, and L5-S1 disc  spaces.  Transpedicular screws with instrumentation rods also noted.  Posterior alignment is normal.    SHRADDHA ALDRIDGE MD

## 2020-12-06 NOTE — PROGRESS NOTES
[facility]/Neurosurgery Progress Note    Date of Service (when I saw the patient): 12/06/2020     Assessment & Plan   Subhash Cantu is a 65 year old male who was admitted on 12/5/2020 with intractable low back pain and L4-5 with BLE pain.  He underwent L3-S1 TLIF with Dr. Valdez Bradford on 12/5/2020.  He is sitting up in the chair.  He states his pain is 7/10.  He notes mainly incision pain.  He denies N/V and tolerating diet.  He notes the burning pain along the left anterior thigh somewhat improved, but he has an achy pain; we discussed could be post surgery nerve pain and/or related to positioning during surgery.  Plan for today is continued pain control and continue to mobilize and work with PT/OT.  Discharge to home tomorrow or Tuesday with family.  He has his ndiaye in place, some bloody noted previously and urology consulted; ok to discontinue ndiaye if they are in agreement.    Active Problems:    S/P lumbar fusion    Assessment: S/p L3-S1 TLIF    Plan:   PT/OT and ambulation  Brace when out of bed   Encourage use of IS and deep breathing           I have discussed the following assessment and plan with Dr Bradford who is in agreement with initial plan and will follow up with further consultation recommendations.    Nicole Leslie [facility]  Tel [phone]    Interval History   Stable; day 1 post op    Physical Exam   Temp: 98.8  F (37.1  C) Temp src: Oral BP: (!) 142/75 Pulse: 68   Resp: 16 SpO2: 96 % O2 Device: None (Room air) Oxygen Delivery: 2 LPM  Vitals:    11/25/20 1419 12/05/20 0555   Weight: 223 lb (101.2 kg) 225 lb 6.4 oz (102.2 kg)     Vital Signs with Ranges  Temp:  [97  F (36.1  C)-98.8  F (37.1  C)] 98.8  F (37.1  C)  Pulse:  [60-82] 68  Resp:  [10-16] 16  BP: ()/(53-76) 142/75  SpO2:  [95 %-99 %] 96 %  I/O last 3 completed shifts:  In: 1930 [I.V.:1930]  Out: 3100 [Urine:2650; Drains:275; Blood:175]     , Blood pressure (!) 142/75,  "pulse 68, temperature 98.8  F (37.1  C), temperature source Oral, resp. rate 16, height 5' 8\" (1.727 m), weight 225 lb 6.4 oz (102.2 kg), SpO2 96 %.  225 lbs 6.4 oz  Lungs:  No SOB  Skin:  Warm and dry, good capillary refill. AMILCAR x2 intact  Extremities:  Good radial and dorsalis pedis pulses bilaterally, no edema, cyanosis or clubbing.    NEUROLOGICAL EXAMINATION:   Mental status:  Awake and alert  Motor:   Hip Flexor:                Right: 5/5  Left:  5/5  Hip Adductor:             Right:  5/5  Left:  5/5  Hip Abductor:             Right:  5/5  Left:  5/5  Gastroc Soleus:        Right:  5/5  Left:  5/5  Tib/Ant:                      Right:  5/5  Left:  5/5  EHL:                     Right:  5/5  Left:  5/5  Gait:  Deferred; sitting upright in chair.  Walked hallways earlier.    Medications       acetaminophen  975 mg Oral Q8H     amLODIPine  10 mg Oral Daily     ceFAZolin  2 g Intravenous Q8H     doxazosin  8 mg Oral Daily with breakfast     famotidine  20 mg Oral BID    Or     famotidine  20 mg Intravenous BID     gabapentin  100 mg Oral At Bedtime     hydrochlorothiazide  25 mg Oral Daily     polyethylene glycol  17 g Oral Daily     senna-docusate  1 tablet Oral BID    Or     senna-docusate  2 tablet Oral BID     senna-docusate  1 tablet Oral BID    Or     senna-docusate  2 tablet Oral BID     sodium chloride (PF)  3 mL Intracatheter Q8H       Data   CBC RESULTS:   Recent Labs   Lab Test 12/06/20  0848   WBC 12.6*   RBC 4.34*   HGB 12.9*   HCT 38.9*   MCV 90   MCH 29.7   MCHC 33.2   RDW 13.9   *     Basic Metabolic Panel:  Lab Results   Component Value Date     12/06/2020      Lab Results   Component Value Date    POTASSIUM 4.0 12/06/2020     Lab Results   Component Value Date    CHLORIDE 109 12/06/2020     Lab Results   Component Value Date    ART 8.3 12/06/2020     Lab Results   Component Value Date    CO2 31 12/06/2020     Lab Results   Component Value Date    BUN 11 12/06/2020     Lab Results "   Component Value Date    CR 0.76 12/06/2020     Lab Results   Component Value Date     12/06/2020     INR:  No results found for: INR

## 2020-12-06 NOTE — PLAN OF CARE
Pt is alert and oriented. VSS on 2 L of oxygen NC, afebrile. Pain managed by Tylenol and PCA dilaudid. Dressing in the back C/D/I, CMS intact. 2 hemovacs patent. Declined to dangle at night. Turn and repo every 2 hours. Chavez catheter in place with adequate O/P. Will continue to monitor.

## 2020-12-06 NOTE — PROGRESS NOTES
12/06/20 1100   Quick Adds   Type of Visit Initial PT Evaluation   Living Environment   People in home spouse   Current Living Arrangements house   Home Accessibility stairs within home;stairs to enter home   Number of Stairs, Main Entrance 1   Stair Railings, Main Entrance none   Number of Stairs, Within Home, Primary 6  (6 up 5)   Stair Railings, Within Home, Primary railing on right side (ascending)   Transportation Anticipated car, drives self;family or friend will provide   Self-Care   Usual Activity Tolerance moderate   Regular Exercise No  (did yardwork)   Equipment Currently Used at Home none   Disability/Function   Fall history within last six months no   General Information   Onset of Illness/Injury or Date of Surgery 12/05/20   Referring Physician Valdez Bradford MD   Patient/Family Therapy Goals Statement (PT) to be able to sit again without pain   Pertinent History of Current Problem (include personal factors and/or comorbidities that impact the POC) Pt ia a 65 YOM POD 1 L3-S1 transformanial lumbar fusion. PMH includes chronic LBP with radiculopathy, DM, CVD, pulmonary disease.   Existing Precautions/Restrictions fall;spinal;brace worn when out of bed   General Observations Pt alert and aware of spinal precautions.   Cognition   Orientation Status (Cognition) oriented x 4   Affect/Mental Status (Cognition) WNL   Pain Assessment   Patient Currently in Pain Yes, see Vital Sign flowsheet   Integumentary/Edema   Integumentary/Edema Comments B AMILCAR drains full and drainin well. Some yellow discharge on sheets noted.    Posture    Posture Comments mild forward flexion   Range of Motion (ROM)   ROM Comment WFL for mobility   Strength   Strength Comments >3/5. WFL for mobility   Bed Mobility   Comment (Bed Mobility) supine to sit with supervision   Transfers   Transfer Safety Comments min A sit to stand to fWW   Gait/Stairs (Locomotion)   Comment (Gait/Stairs) SBA with A for equipment and wheelchair  follow x5' with FWW   Balance   Balance Comments good sitting balance and static standing balance.   Sensory Examination   Sensory Perception Comments numbness anterior R thigh   Clinical Impression   Criteria for Skilled Therapeutic Intervention yes, treatment indicated   PT Diagnosis (PT) Impaired functional mobility   Influenced by the following impairments pain, weakness, decreased activity tolerance, decreased balance   Functional limitations due to impairments pt requires assist of 1 for mobility   Clinical Presentation Stable/Uncomplicated   Clinical Presentation Rationale Pt progressing per pathway   Clinical Decision Making (Complexity) low complexity   Therapy Frequency (PT) 2x/day   Predicted Duration of Therapy Intervention (days/wks) 3 days   Planned Therapy Interventions (PT) balance training;strengthening;transfer training;gait training;bed mobility training   Anticipated Equipment Needs at Discharge (PT) walker, rolling   Risk & Benefits of therapy have been explained evaluation/treatment results reviewed;care plan/treatment goals reviewed;risks/benefits reviewed;participants included;patient   Clinical Impression Comments Pt is mobilizing well and aware of restrictions.   PT Discharge Planning    PT Discharge Recommendation (DC Rec) home with assist   PT Rationale for DC Rec Pt is below baseline level of independence with mobility however with FWW and assist from spouse on stairs he is appropriate to go home.   PT Brief overview of current status  Pt requires supervision for bed mobility and SBA using FWW for transfers and gait. Anticipate he will continue to progress and require less assist without IV/drains.   Total Evaluation Time   Total Evaluation Time (Minutes) 20   Skin WDL   Skin WDL X   Skin Integrity incision

## 2020-12-06 NOTE — PROVIDER NOTIFICATION
Web texted on call provider in regards to the patient's concern of scant blood at the ndiaye insertion point. Per the provider we will keep monitoring and urologist consult placed.

## 2020-12-06 NOTE — PLAN OF CARE
Alert and oriented x's 4.  Dilaudid PCA dc'd today, changed to po dilaudid.  IV to SL.  Up with brace on and SBA.  Ambulated in halls with PT.  Ndiaye catheter to straight drainage.  Placed urology consult for blood at insertion site and blood in urine.  Spoke with urology over the phone.  Leave ndiaye in until 12/7/20 at 7 am per urology order.  Regular diet.  Will continue to monitor.

## 2020-12-07 ENCOUNTER — APPOINTMENT (OUTPATIENT)
Dept: PHYSICAL THERAPY | Facility: CLINIC | Age: 65
DRG: 455 | End: 2020-12-07
Attending: NEUROLOGICAL SURGERY
Payer: COMMERCIAL

## 2020-12-07 ENCOUNTER — APPOINTMENT (OUTPATIENT)
Dept: PHYSICAL THERAPY | Facility: CLINIC | Age: 65
End: 2020-12-07
Attending: NEUROLOGICAL SURGERY
Payer: COMMERCIAL

## 2020-12-07 ENCOUNTER — APPOINTMENT (OUTPATIENT)
Dept: OCCUPATIONAL THERAPY | Facility: CLINIC | Age: 65
End: 2020-12-07
Attending: NEUROLOGICAL SURGERY
Payer: COMMERCIAL

## 2020-12-07 VITALS
SYSTOLIC BLOOD PRESSURE: 116 MMHG | OXYGEN SATURATION: 94 % | TEMPERATURE: 98.8 F | WEIGHT: 225.4 LBS | BODY MASS INDEX: 34.16 KG/M2 | RESPIRATION RATE: 16 BRPM | HEIGHT: 68 IN | DIASTOLIC BLOOD PRESSURE: 77 MMHG | HEART RATE: 75 BPM

## 2020-12-07 LAB
ALBUMIN UR-MCNC: 30 MG/DL
APPEARANCE UR: CLEAR
BILIRUB UR QL STRIP: NEGATIVE
COLOR UR AUTO: YELLOW
GLUCOSE SERPL-MCNC: 125 MG/DL (ref 70–99)
GLUCOSE UR STRIP-MCNC: NEGATIVE MG/DL
HGB BLD-MCNC: 13.8 G/DL (ref 13.3–17.7)
HGB UR QL STRIP: ABNORMAL
KETONES UR STRIP-MCNC: NEGATIVE MG/DL
LEUKOCYTE ESTERASE UR QL STRIP: ABNORMAL
MUCOUS THREADS #/AREA URNS LPF: PRESENT /LPF
NITRATE UR QL: NEGATIVE
PH UR STRIP: 7 PH (ref 5–7)
RBC #/AREA URNS AUTO: 140 /HPF (ref 0–2)
SOURCE: ABNORMAL
SP GR UR STRIP: 1.02 (ref 1–1.03)
UROBILINOGEN UR STRIP-MCNC: NORMAL MG/DL (ref 0–2)
WBC #/AREA URNS AUTO: 6 /HPF (ref 0–5)

## 2020-12-07 PROCEDURE — 82947 ASSAY GLUCOSE BLOOD QUANT: CPT | Performed by: NEUROLOGICAL SURGERY

## 2020-12-07 PROCEDURE — 97535 SELF CARE MNGMENT TRAINING: CPT | Mod: GO

## 2020-12-07 PROCEDURE — 97116 GAIT TRAINING THERAPY: CPT | Mod: GP

## 2020-12-07 PROCEDURE — 85018 HEMOGLOBIN: CPT | Performed by: NEUROLOGICAL SURGERY

## 2020-12-07 PROCEDURE — 99231 SBSQ HOSP IP/OBS SF/LOW 25: CPT | Performed by: INTERNAL MEDICINE

## 2020-12-07 PROCEDURE — 97530 THERAPEUTIC ACTIVITIES: CPT | Mod: GP

## 2020-12-07 PROCEDURE — 250N000013 HC RX MED GY IP 250 OP 250 PS 637: Performed by: NEUROLOGICAL SURGERY

## 2020-12-07 PROCEDURE — 36415 COLL VENOUS BLD VENIPUNCTURE: CPT | Performed by: NEUROLOGICAL SURGERY

## 2020-12-07 PROCEDURE — 250N000013 HC RX MED GY IP 250 OP 250 PS 637: Performed by: NURSE PRACTITIONER

## 2020-12-07 PROCEDURE — 97530 THERAPEUTIC ACTIVITIES: CPT | Mod: GO

## 2020-12-07 PROCEDURE — 81001 URINALYSIS AUTO W/SCOPE: CPT | Performed by: PHYSICIAN ASSISTANT

## 2020-12-07 PROCEDURE — 97165 OT EVAL LOW COMPLEX 30 MIN: CPT | Mod: GO

## 2020-12-07 PROCEDURE — 250N000011 HC RX IP 250 OP 636: Performed by: NEUROLOGICAL SURGERY

## 2020-12-07 RX ORDER — GABAPENTIN 300 MG/1
300 TABLET, FILM COATED ORAL
Start: 2020-12-07

## 2020-12-07 RX ADMIN — AMLODIPINE BESYLATE 10 MG: 10 TABLET ORAL at 08:07

## 2020-12-07 RX ADMIN — FAMOTIDINE 20 MG: 20 TABLET ORAL at 08:07

## 2020-12-07 RX ADMIN — HYDROCHLOROTHIAZIDE 25 MG: 25 TABLET ORAL at 08:07

## 2020-12-07 RX ADMIN — ACETAMINOPHEN 975 MG: 325 TABLET, FILM COATED ORAL at 06:44

## 2020-12-07 RX ADMIN — CEFAZOLIN SODIUM 2 G: 2 INJECTION, SOLUTION INTRAVENOUS at 11:30

## 2020-12-07 RX ADMIN — DOXAZOSIN 8 MG: 4 TABLET ORAL at 08:07

## 2020-12-07 RX ADMIN — HYDROMORPHONE HYDROCHLORIDE 4 MG: 2 TABLET ORAL at 16:42

## 2020-12-07 RX ADMIN — CEFAZOLIN SODIUM 2 G: 2 INJECTION, SOLUTION INTRAVENOUS at 03:10

## 2020-12-07 RX ADMIN — HYDROMORPHONE HYDROCHLORIDE 4 MG: 2 TABLET ORAL at 11:29

## 2020-12-07 RX ADMIN — DOCUSATE SODIUM 50 MG AND SENNOSIDES 8.6 MG 2 TABLET: 8.6; 5 TABLET, FILM COATED ORAL at 08:07

## 2020-12-07 RX ADMIN — HYDROMORPHONE HYDROCHLORIDE 4 MG: 2 TABLET ORAL at 08:06

## 2020-12-07 RX ADMIN — ACETAMINOPHEN 975 MG: 325 TABLET, FILM COATED ORAL at 14:17

## 2020-12-07 RX ADMIN — DOCUSATE SODIUM 50 MG AND SENNOSIDES 8.6 MG 1 TABLET: 8.6; 5 TABLET, FILM COATED ORAL at 11:31

## 2020-12-07 RX ADMIN — POLYETHYLENE GLYCOL 3350 17 G: 17 POWDER, FOR SOLUTION ORAL at 08:07

## 2020-12-07 RX ADMIN — HYDROMORPHONE HYDROCHLORIDE 4 MG: 2 TABLET ORAL at 03:47

## 2020-12-07 ASSESSMENT — ACTIVITIES OF DAILY LIVING (ADL)
ADLS_ACUITY_SCORE: 18
PREVIOUS_RESPONSIBILITIES: MEAL PREP;HOUSEKEEPING;LAUNDRY;SHOPPING;YARDWORK;DRIVING

## 2020-12-07 NOTE — PROGRESS NOTES
[facility]    Urology Brief Note     Consult received. Spoke with RN. Catheter was removed this AM and Pt is due to void. Didn't have any clots in his ndiaye today. Did have a few yesterday.     Plan:    Tov per routine    I am in surgery all morning and will see for formal consultation, follow up coordination around mid-day today    Please call/page for emergent concerns    Antoine Clarke PA-C 12/7/2020 7:10 AM  [facility]  Mon-Fri, 7am - 4pm  Office: [phone]

## 2020-12-07 NOTE — PLAN OF CARE
Occupational Therapy Discharge Summary    Reason for therapy discharge:    All goals and outcomes met, no further needs identified.    Progress towards therapy goal(s). See goals on Care Plan in [facility] electronic health record for goal details.  Goals met    Therapy recommendation(s):    Recommended spouse to assist with LB dressing and/or showering tasks as needed.

## 2020-12-07 NOTE — DISCHARGE SUMMARY
[facility]  Neurosurgery    Date of Admission:  12/5/2020  Date of Discharge:  12/7/2020  Discharging Provider: Nicole Leslie  Date of Service (when I saw the patient): 12/07/20    Discharge Diagnoses   Active Problems:    S/P lumbar fusion      History of Present Illness   Subhash Catnu is a 65 year old male who was admitted on 12/5/2020 with intractable low back pain and L4-5 stenosis with BLE pain.  He underwent L3-S1 TLIF with Dr. Valdez Bradford on 12/5/2020.  He states his pain has been well controlled with Dilaudid po, mainly incision pain.  He has had mild relief of his leg pain.  Hospitalist following for medical management; ok for discharge per hospitalist as medically stable, appreciate services.  Patient had hematuria with ndiaye, urology consulted.  Ndiaye removed and patient voiding; urology ok for discharge without ndiaye if PVR <350 and recommend f/u with urology if ongoing symptoms.  AMILCAR x2 with output 30cc each drain over day shift and ok to discontinue.  Pt met OT/PT goals.  Will discharge to home with family.  Had reviewed discharge instructions including incision care, bracing when out of bed, medications and activities.  Ok to restart ASA on 12/9/2020.        Hospital Course   Subhash Cantu was admitted on 12/5/2020.  The following problems were addressed during his hospitalization:    Active Problems:    S/P lumbar fusion    Assessment: S/p lumbar fusion    Plan: Ok for discharge to home        I have discussed the following assessment and plan with Dr. Bradford who is in agreement with initial plan and will follow up with further consultation recommendations.      Nicole Leslie [facility]  Tel [phone]      Pending Results     Unresulted Labs Ordered in the Past 30 Days of this Admission     No orders found from 11/5/2020 to 12/6/2020.          Code Status   Full Code    Primary Care Physician   Abhinav SAUCEDO  Becca    Physical Exam   Temp: 98.8  F (37.1  C) Temp src: Oral BP: 116/77 Pulse: 75   Resp: 16 SpO2: 94 % O2 Device: None (Room air)    Vitals:    11/25/20 1419 12/05/20 0555   Weight: 223 lb (101.2 kg) 225 lb 6.4 oz (102.2 kg)     Vital Signs with Ranges  Temp:  [98.4  F (36.9  C)-99  F (37.2  C)] 98.8  F (37.1  C)  Pulse:  [] 75  Resp:  [14-18] 16  BP: (116-144)/(56-85) 116/77  SpO2:  [92 %-94 %] 94 %  I/O last 3 completed shifts:  In: 120 [P.O.:120]  Out: 3360 [Urine:3200; Drains:160]    Constitutional: Awake, alert, cooperative, no apparent distress, and appears stated age.  ENT: Normocephalic, without obvious abnormality, atraumatic  Respiratory: No increased work of breathing  Skin: Incision covered with dry dressing. I  Musculoskeletal:   Motor strength is 5 out of 5 all extremities bilaterally.    Neurologic: Awake, alert       Time Spent on this Encounter   I, JACOBY Baez CNP, personally saw the patient today and spent less than or equal to 30 minutes discharging this patient.    Discharge Disposition   Discharged to home  Condition at discharge: Stable    Consultations This Hospital Stay   HOSPITALIST IP CONSULT  OCCUPATIONAL THERAPY ADULT IP CONSULT  PHYSICAL THERAPY ADULT IP CONSULT  UROLOGY IP CONSULT    Discharge Orders      Reason for your hospital stay    You were in the hospital for L3 thru S1 lumbar fusion surgery.     Follow-up and recommended labs and tests     Please follow up at [facility] 12-14 days post surgery for your staples to be removed.  Please call the clinic at [phone] to schedule your appointment with Nicole Leslie CNP.     Activity    Your activity upon discharge: Discharge instructions:  No lifting of more than 10 pounds, no bending, no twisting until follow up visit.  Wear brace when out of bed.    Ok to shampoo hair, shower or bathe, but, do not scrub or submerge incision under water until evaluated post-operatively in clinic.    Ok to walk as  tolerated, avoid bed rest and prolonged sitting.    No contact sports until after follow up visit  No high impact activities such as; running/jogging, snowmobile or 4 patel riding or any other recreational vehicles.    Do not take NSAIDS (ibuprofen, advil, aleve, naproxen, etc) for pain control.    Do NOT drive while taking narcotic pain medication.    Call [facility] at [phone] for increasing redness, swelling or pus draining from the incision, increased pain or any other questions and concerns.     Wound care and dressings    Instructions to care for your wound at home: Keep your incision clean and dry at all times.  OK to remove dressing on postop day 2.  OK to shower on postop day 3 and allow water to run over incision, pat dry after shower.  No bathing swimming or submerging in water.  Call immediately or come to ED if any drainage occurs, or you develop new pain, redness, or swelling.     Follow-up and recommended labs and tests     Please call to schedule an appointment with Dr. Douglass of [facility] for follow up regarding the blood in your urine.     [facility]  [address] [place] [address], [address]  [place] [address]  Appointment Phone: [phone]     Full Code     Walker    DME Documentation:   Describe the reason for need to support medical necessity: s/p L3-S1 TLIF with need for UE support during gait.     I, the undersigned, certify that the above prescribed supplies are medically necessary for this patient and is both reasonable and necessary in reference to accepted standards of medical and necessary in reference to accepted standards of medical practice in the treatment of this patient's condition and is not prescribed as a convenience.     Diet    Follow this diet upon discharge: resume pre surgery diet     Discharge Medications   Current Discharge Medication List      START taking these medications    Details   HYDROmorphone (DILAUDID) 2 MG tablet Take  1-2 tablets (2-4 mg) by mouth every 6 hours as needed for pain or severe pain (Stop Oxycodone and may take Dilaudid for post op pain.  Max of 6 tablets per day)  Qty: 42 tablet, Refills: 0    Associated Diagnoses: S/P lumbar fusion         CONTINUE these medications which have CHANGED    Details   aspirin 81 MG EC tablet Take 1 tablet (81 mg) by mouth daily  Qty: 1 tablet, Refills: 0    Comments: Patient may restart ASA on 12/9/2020; no Rx needed.  Pt can use home supply.  Associated Diagnoses: S/P lumbar fusion      gabapentin (GRALISE) 300 MG 24 hr tablet Take 1 tablet (300 mg) by mouth nightly as needed (Nerve pain)    Associated Diagnoses: S/P lumbar fusion         CONTINUE these medications which have NOT CHANGED    Details   AmLODIPine Besylate (NORVASC PO) Take 10 mg by mouth daily       !! Ascorbic Acid (VITAMIN C PO)       !! Ascorbic Acid (VITAMIN C) 500 MG CAPS Take by mouth daily      baclofen (LIORESAL) 10 MG tablet Take 10 mg by mouth 3 times daily as needed for muscle spasms      Calcium Carb-Cholecalciferol (CALCIUM 600 + D PO) Take by mouth 2 times daily      doxazosin (CARDURA) 8 MG tablet Take 8 mg by mouth daily (with breakfast)       fish oil-omega-3 fatty acids 1000 MG capsule Take 2 g by mouth daily      glucosamine-chondroitin 500-400 MG CAPS per capsule Take 1 capsule by mouth daily      hydrochlorothiazide (HYDRODIURIL) 25 MG tablet Take 25 mg by mouth daily       Multiple Vitamins-Minerals (MULTIVITAMIN ADULT PO) Take by mouth daily       psyllium (METAMUCIL/KONSYL) 58.6 % powder Take by mouth daily      TRAZODONE HCL PO Take 150 mg by mouth nightly as needed       Vitamin D, Cholecalciferol, 1000 units CAPS Take by mouth daily        !! - Potential duplicate medications found. Please discuss with provider.      STOP taking these medications       OXYCODONE HCL PO Comments:   Reason for Stopping:             Allergies   No Known Allergies

## 2020-12-07 NOTE — PROGRESS NOTES
12/07/20 0800   Quick Adds   Type of Visit Initial Occupational Therapy Evaluation   Living Environment   People in home spouse   Current Living Arrangements house   Home Accessibility stairs within home;stairs to enter home   Number of Stairs, Main Entrance 1   Stair Railings, Main Entrance none   Number of Stairs, Within Home, Primary 6  (To navigate upstairs)   Stair Railings, Within Home, Primary railing on right side (ascending)   Transportation Anticipated car, drives self;family or friend will provide   Living Environment Comments Pt has a walk-in shower    Self-Care   Usual Activity Tolerance good   Current Activity Tolerance fair   Regular Exercise Yes   Activity/Exercise Type walking   Exercise Amount/Frequency 3-5 times/wk   Equipment Currently Used at Home none  (Has TLSO from past)   Disability/Function   Hearing Difficulty or Deaf yes   Patient's preferred means of communication verbal   Describe hearing loss bilateral hearing loss   Use of hearing assistive devices bilateral hearing aids   Were auxiliary aids offered? no   The following aids were provided; patient declined offer of auxiliary aids   Hearing Management High volume of voice   Wear Glasses or Blind yes   Vision Management Glasses   Concentrating, Remembering or Making Decisions Difficulty no   Difficulty Communicating no   Difficulty Eating/Swallowing no   Walking or Climbing Stairs Difficulty no   Dressing/Bathing Difficulty no   Toileting issues no   Doing Errands Independently Difficulty (such as shopping) no   Fall history within last six months no   Change in Functional Status Since Onset of Current Illness/Injury   (None)   General Information   Onset of Illness/Injury or Date of Surgery 12/02/20   Referring Physician Valdez Bradford MD   Patient/Family Therapy Goal Statement (OT) Return home   Additional Occupational Profile Info/Pertinent History of Current Problem Subhash Cantu is a 65 year old male with PMH  chronic lower back pain with radiculopathy, impaired fasting glucose, BPH, HTN, who was admitted on 12/5/2020 for lumbar fusion. Hospitalist service consulted 12/5 for medical management.   Performance Patterns (Routines, Roles, Habits) Pt ind at baseline with I/ADL's   General Observations and Info Pt demo'd positive demeanor and agreeable to therapy.    Cognitive Status Examination   Orientation Status orientation to person, place and time   Affect/Mental Status (Cognitive) WNL   Follows Commands WNL   Visual Perception   Visual Impairment/Limitations WNL   Sensory   Sensory Quick Adds No deficits were identified   Pain Assessment   Patient Currently in Pain Yes, see Vital Sign flowsheet   Posture   Posture not impaired   Range of Motion Comprehensive   General Range of Motion no range of motion deficits identified   Strength Comprehensive (MMT)   General Manual Muscle Testing (MMT) Assessment no strength deficits identified   Muscle Tone Assessment   Muscle Tone Quick Adds No deficits were identified   Coordination   Upper Extremity Coordination No deficits were identified   Bed Mobility   Bed Mobility supine-sit;sit-supine   Supine-Sit [place] (Bed Mobility) supervision   Sit-Supine [place] (Bed Mobility) supervision   Assistive Device (Bed Mobility) bed rails   Transfers   Transfers toilet transfer;sit-stand transfer   Sit-Stand Transfer   Sit-Stand [place] (Transfers) supervision;verbal cues   Assistive Device (Sit-Stand Transfers) walker, standard   Toilet Transfer   Type (Toilet Transfer) sit-stand   [place] Level (Toilet Transfer) supervision   Assistive Device (Toilet Transfer) walker, standard;grab bars/safety frame   Balance   Balance Assessment no deficits were identified   Activities of Daily Living   BADL Assessment/Intervention upper body dressing;lower body dressing;grooming;feeding;toileting   Upper Body Dressing Assessment/Training   [place] Level (Upper Body Dressing)  supervision   Position (Upper Body Dressing) unsupported sitting   Lower Body Dressing Assessment/Training   Haines Level (Lower Body Dressing) supervision   Assistive Devices (Lower Body Dressing) leg ;reacher;sock-aid   Position (Lower Body Dressing) unsupported sitting   Grooming Assessment/Training   Haines Level (Grooming) modified independence   Position (Grooming) supported standing   Eating/Self Feeding   Haines Level (Feeding) independent   Toileting   Haines Level (Toileting) supervision   Instrumental Activities of Daily Living (IADL)   Previous Responsibilities meal prep;housekeeping;laundry;shopping;yardwork;driving  (Pt is retired; spouse able to assist as needed)   Clinical Impression   Criteria for Skilled Therapeutic Interventions Met (OT) yes   OT Diagnosis Decreased ind with I/ADL's   OT Problem List-Impairments impacting ADL activity tolerance impaired;strength   Assessment of Occupational Performance 1-3 Performance Deficits   Identified Performance Deficits Dressing, toileting, bathing   Planned Therapy Interventions (OT) ADL retraining;strengthening;transfer training;progressive activity/exercise   Clinical Decision Making Complexity (OT) low complexity   Therapy Frequency (OT) Daily   Predicted Duration of Therapy Eval and one treat   Risks and Benefits of Treatment have been explained. Yes   Patient, Family & other staff in agreement with plan of care Yes   OT Discharge Planning    OT Discharge Recommendation (DC Rec) Home with assist   OT Rationale for DC Rec Pt progressing well with all IP OT goals met. Pt aware of and able to demo adherence to spinal precautions during I/ADL and transfer engagement. Pt reported having all necessary AE in home for safety with ADL routine.    OT Brief overview of current status  SBA for ADL's, mobility, and transfers with use of AE. Pt SBA for donning/doffing TLSO brace.    Total Evaluation Time (Minutes)   Total Evaluation  Time (Minutes) 8

## 2020-12-07 NOTE — PLAN OF CARE
Pt is alert & oriented, VSS on RA, afebrile. CMS intact with RLE numbness (baseline). Dressing is C/D/I, 2 AMILCAR drains in place, patent. Up with assist of 1 using a walker and GB, brace on when OOB. Chavez catheter dc'd with adequate output, due to void. IV SL. Pain managed by Tylenol and dilaudid. Will continue to monitor.

## 2020-12-07 NOTE — PROGRESS NOTES
Pt is AOX4, numbness on RLE baseline, pain somewhat managed with po dilaudid, brace on when OOB, ambulated in [place] with 1 assist, Chavez patent, regular diet. Will cont to monitor.

## 2020-12-07 NOTE — PROGRESS NOTES
[facility]    Hospitalist Progress Note    Interval History   - Doing well today. Walking with PT with back brace.  - He reports minimal if any radiculopathy. Will change gabapentin to PRN at discharge. Patient is medically stable to discharge    Assessment & Plan   Summary:  Subhash Cantu is a 65 year old male with PMH chronic lower back pain with radiculopathy, impaired fasting glucose, BPH, HTN, who was admitted on 12/5/2020 for lumbar fusion. Hospitalist service consulted 12/5 for medical management.     Chronic lower back pain with radiculopathy, s/p L3-4, L4-5, L5-S1 fusion on 12/5/2020  Neurosurgery is primary. There was no intra-operative complications. EBL 175ml. They are managing post-op surgical issues including pain  - Neurosurgery post-op management   - Remains on pain PCA   - Drains still in place  - Decrease gabapentin to 100mg at bedtime as radiculopathy is expected to improve    Hematuria: Appears to be related to traumatic ndiaye insertion. No blood clots appreciated.  - Urology consulted by NSG team  - Consider trial to void     Hypertension: Stable here 142/75  - Continue PTA amlodipine 10mg daily, hydrochlorothiazide 25mg daily     Prediabetes: A1c 5.8% this admission. Discussed dietary modifications, weight loss as long term strategies to DM2 risk.  - BID glucose checks     BPH: Chronic and stable on doxazosin, continue here    DVT Prophylaxis: Pneumatic Compression Devices  Code Status: Full Code  PT/OT: ordered  Diet: Advance Diet as Tolerated: Regular Diet Adult  Diet      Disposition: Expected discharge per Neurosurgery    Jaguar Mercer MD  Text Page  (7am to 6pm)  -Data reviewed today: I reviewed all new labs and imaging results over the last 24 hours.    Physical Exam   Temp: 98.6  F (37  C) Temp src: Oral BP: (!) 144/85 Pulse: 73   Resp: 16 SpO2: 92 % O2 Device: None (Room air)    Vitals:    11/25/20 1419 12/05/20 0555   Weight: 101.2 kg (223 lb) 102.2 kg  (225 lb 6.4 oz)     Vital Signs with Ranges  Temp:  [98.4  F (36.9  C)-99  F (37.2  C)] 98.6  F (37  C)  Pulse:  [] 73  Resp:  [14-18] 16  BP: (129-144)/(56-85) 144/85  SpO2:  [92 %-94 %] 92 %  I/O last 3 completed shifts:  In: 666 [P.O.:660; I.V.:6]  Out: 4490 [Urine:4250; Drains:240]  O2 requirements: PCO2 monitoring    Constitutional: Male in NAD  HEENT: Eyes nonicteric, oral mucosa moist  Cardiovascular: RRR, normal S1/2, no m/r/g  Respiratory: CTAB, no wheezing or crackles  Vascular: No LE pitting edema  GI: Normoactive bowel sounds, nontender, nondistended  Skin/Integumen: No rashes, back drains noted  Neuro/Psych: Appropriate affect and mood. A&Ox3, moves all extremities    Medications       acetaminophen  975 mg Oral Q8H     amLODIPine  10 mg Oral Daily     ceFAZolin  2 g Intravenous Q8H     doxazosin  8 mg Oral Daily with breakfast     famotidine  20 mg Oral BID    Or     famotidine  20 mg Intravenous BID     gabapentin  100 mg Oral At Bedtime     hydrochlorothiazide  25 mg Oral Daily     polyethylene glycol  17 g Oral Daily     senna-docusate  1 tablet Oral BID    Or     senna-docusate  2 tablet Oral BID     senna-docusate  1 tablet Oral BID    Or     senna-docusate  2 tablet Oral BID     sodium chloride (PF)  3 mL Intracatheter Q8H       Data   Recent Labs   Lab 12/07/20  0652 12/06/20  0848 12/05/20  1953   WBC  --  12.6*  --    HGB 13.8 12.9*  --    MCV  --  90  --    PLT  --  149*  --    NA  --  142  --    POTASSIUM  --  4.0  --    CHLORIDE  --  109  --    CO2  --  31  --    BUN  --  11  --    CR  --  0.76 0.80   ANIONGAP  --  2*  --    ART  --  8.3*  --    * 121*  --        Imaging:   No results found for this or any previous visit (from the past 24 hour(s)).

## 2020-12-07 NOTE — PROGRESS NOTES
[facility]    Date of Service (when I saw the patient): 12/07/2020     Assessment & Plan   Subhash Cantu is a 65 year old male who was admitted on 12/5/2020 with intractable low back pain and L4-5 stenosis with BLE pain.  He underwent L3-S1 TLIF with Dr. Valdez Bradford on 12/5/2020. This morning he states his pain is moderate, controlled with Dilaudid po.  He has continued to have trouble sleeping, he states mostly due to interruptions coming in and out of his room.  We discussed working with PT/OT today and if continues to do well, and drain output slows down then likely discharge to home later today.  Patient has urology consult for hematuria with ndiaye; ndiaye removed and urology will be seeing patient later today.     Active Problems:    S/P lumbar fusion    Assessment: S/p lumbar fusion    Plan: Work with OT/PT today, recommendations       I have discussed the following assessment and plan with Dr. Bradford who is in agreement with initial plan and will follow up with further consultation recommendations.    Nicole Leslie [facility]  Tel [phone]    Interval History   Day 1 post op; stable    Physical Exam   Temp: 98.6  F (37  C) Temp src: Oral BP: (!) 144/85 Pulse: 73   Resp: 16 SpO2: 92 % O2 Device: None (Room air)    Vitals:    11/25/20 1419 12/05/20 0555   Weight: 223 lb (101.2 kg) 225 lb 6.4 oz (102.2 kg)     Vital Signs with Ranges  Temp:  [98.4  F (36.9  C)-99  F (37.2  C)] 98.6  F (37  C)  Pulse:  [] 73  Resp:  [14-18] 16  BP: (129-144)/(56-85) 144/85  SpO2:  [92 %-95 %] 92 %  I/O last 3 completed shifts:  In: 666 [P.O.:660; I.V.:6]  Out: 4490 [Urine:4250; Drains:240]     , Blood pressure (!) 144/85, pulse 73, temperature 98.6  F (37  C), temperature source Oral, resp. rate 16, height 5' 8\" (1.727 m), weight 225 lb 6.4 oz (102.2 kg), SpO2 92 %.  225 lbs 6.4 oz  Heart:  No peripheral edema  Lungs:  No SOB  Skin:  " Warm and dry, good capillary refill. AMILCAR x2 intact.  Extremities:  Good radial and dorsalis pedis pulses bilaterally, no edema, cyanosis or clubbing.    NEUROLOGICAL EXAMINATION:   Mental status:  Awake and alert, speech is fluent.  Motor: Stable strength to extremities  Gait:  Deferred; semi-upright in bed getting ready to eat breakfast    Medications       acetaminophen  975 mg Oral Q8H     amLODIPine  10 mg Oral Daily     ceFAZolin  2 g Intravenous Q8H     doxazosin  8 mg Oral Daily with breakfast     famotidine  20 mg Oral BID    Or     famotidine  20 mg Intravenous BID     gabapentin  100 mg Oral At Bedtime     hydrochlorothiazide  25 mg Oral Daily     polyethylene glycol  17 g Oral Daily     senna-docusate  1 tablet Oral BID    Or     senna-docusate  2 tablet Oral BID     senna-docusate  1 tablet Oral BID    Or     senna-docusate  2 tablet Oral BID     sodium chloride (PF)  3 mL Intracatheter Q8H       Data   CBC RESULTS:   Recent Labs   Lab Test 12/07/20  0652 12/06/20  0848   WBC  --  12.6*   RBC  --  4.34*   HGB 13.8 12.9*   HCT  --  38.9*   MCV  --  90   MCH  --  29.7   MCHC  --  33.2   RDW  --  13.9   PLT  --  149*     Basic Metabolic Panel:  Lab Results   Component Value Date     12/06/2020      Lab Results   Component Value Date    POTASSIUM 4.0 12/06/2020     Lab Results   Component Value Date    CHLORIDE 109 12/06/2020     Lab Results   Component Value Date    ART 8.3 12/06/2020     Lab Results   Component Value Date    CO2 31 12/06/2020     Lab Results   Component Value Date    BUN 11 12/06/2020     Lab Results   Component Value Date    CR 0.76 12/06/2020     Lab Results   Component Value Date     12/07/2020     INR:  No results found for: INR

## 2020-12-07 NOTE — CONSULTS
[facility]    Urology Consultation     Date of Admission:  12/5/2020    Assessment & Plan   Subhash Cantu is a 65 year old male who was admitted on 12/5/2020. I was asked to see the patient for gross hematuria following ndiaye placement. Hematuria likely due to traumatic ndiaye placement. Ndiaye out now and due to void.    Saw Urologist Dr. Douglass in 2018 for gross hematuria. Cysto showed obstructing lateral lobes, hypervascular prostate, trabeculated bladder, no bladder tumors.    Plan:     Scan for PVR. If <350 can discharge without ndiaye catheter. If >350 may need to have ndiaye catheter replaced and follow up for voiding trial.     Send UCx to r/o UTI    Continue PTA doxazosin    Can follow up with Dr. Douglass in 2-4 weeks if having continual hematuria or for PVR recheck (AVS updated)      Antoine Clarke PA-C 12/7/2020 1:40 PM  [facility]  Mon-Fri, 7am - 4pm  Office: [phone]      Code Status    Full Code    Reason for Consult   Reason for consult: Hematuria    Primary Care Physician   Abhinav Hudson    Chief Complaint   Gross hematuria after ndiaye placement    History is obtained from the patient and electronic health record    History of Present Illness   Subhash Cantu is a 65 year old male who presents with gross hematuria following ndiaye placement for spinal surgery. Had L3-S1 fusion on 12/5 with Dr. Bradford. Had ndiaye placed for procedure, and found to have gross hematuria yesterday. Patient reports urine was dark red and there were a few clots but this morning urine was pink. Also reported sever urgency with ndiaye place and occasional bypassing. Ndiaye was removed this morning per routine voiding trial and patient has voided twice. Once for 200, one 100. No PVR's have been conducted yet. Reports his urine is now light pink. Has mild dysuria, but improving. No flank pain, feeling of incomplete emptying or abd pain.    Reports he had some blood in his urine before and  saw urology who did cytoscopy and found he had some bleeding from his prostate but no other concerns. Reports he has been taking cardura for a couple of years and has stable x2 nocturia.     No Hx of stones, prostate cancer, or prostate surgery. Has PSA checked with PCP every without concerns.     Past Medical History   I have reviewed this patient's medical history and updated it with pertinent information if needed.   Past Medical History:   Diagnosis Date     Depression      Diverticulosis     sigmoid     Hypertension      Impaired fasting glucose      Rhinitis        Past Surgical History   I have reviewed this patient's surgical history and updated it with pertinent information if needed.  Past Surgical History:   Procedure Laterality Date     BACK SURGERY      lumbar laminectomy     COLONOSCOPY       HERNIA REPAIR      bilateral inguinal, umbilical     ORTHOPEDIC SURGERY Bilateral     knee arthroscopy     STAPEDECTOMY       TONSILLECTOMY & ADENOIDECTOMY         Prior to Admission Medications   Prior to Admission Medications   Prescriptions Last Dose Informant Patient Reported? Taking?   AmLODIPine Besylate (NORVASC PO) 12/5/2020 at Unknown time  Yes Yes   Sig: Take 10 mg by mouth daily    Ascorbic Acid (VITAMIN C PO) 12/3/2020  Yes Yes   Ascorbic Acid (VITAMIN C) 500 MG CAPS   Yes Yes   Sig: Take by mouth daily   Calcium Carb-Cholecalciferol (CALCIUM 600 + D PO) Past Week at Unknown time  Yes Yes   Sig: Take by mouth 2 times daily   Multiple Vitamins-Minerals (MULTIVITAMIN ADULT PO) Past Week at Unknown time  Yes Yes   Sig: Take by mouth daily    OXYCODONE HCL PO 12/4/2020 at Unknown time  Yes No   Sig: Take 10 mg by mouth every 4 hours as needed    TRAZODONE HCL PO 12/4/2020 at Unknown time  Yes Yes   Sig: Take 150 mg by mouth nightly as needed    Vitamin D, Cholecalciferol, 1000 units CAPS Past Week at Unknown time  Yes Yes   Sig: Take by mouth daily    aspirin 81 MG EC tablet 12/3/2020  Yes No   Sig: Take  81 mg by mouth daily   baclofen (LIORESAL) 10 MG tablet 12/4/2020  Yes Yes   Sig: Take 10 mg by mouth 3 times daily as needed for muscle spasms   doxazosin (CARDURA) 8 MG tablet 12/5/2020 at Unknown time  Yes Yes   Sig: Take 8 mg by mouth daily (with breakfast)    fish oil-omega-3 fatty acids 1000 MG capsule Past Week at Unknown time  Yes Yes   Sig: Take 2 g by mouth daily   gabapentin (GRALISE) 300 MG 24 hr tablet 12/5/2020 at Unknown time  Yes Yes   Sig: Take 300 mg by mouth daily (with dinner) 2 am, 3 mid day, 3 at bed   glucosamine-chondroitin 500-400 MG CAPS per capsule Past Week at Unknown time  Yes Yes   Sig: Take 1 capsule by mouth daily   hydrochlorothiazide (HYDRODIURIL) 25 MG tablet 12/4/2020 at Unknown time  Yes Yes   Sig: Take 25 mg by mouth daily    psyllium (METAMUCIL/KONSYL) 58.6 % powder 12/4/2020 at Unknown time  Yes Yes   Sig: Take by mouth daily      Facility-Administered Medications: None     Allergies   No Known Allergies    Social History   I have reviewed this patient's social history and updated it with pertinent information if needed. Subhash Cantu  reports that he quit smoking about 18 years ago. His smoking use included cigarettes. He has never used smokeless tobacco. He reports previous alcohol use. He reports that he does not use drugs.    Family History   I have reviewed this patient's family history and updated it with pertinent information if needed.   History reviewed. No pertinent family history.    Review of Systems   The 10 point Review of Systems is negative other than noted in the HPI or here.     Physical Exam   Temp: 98.6  F (37  C) Temp src: Oral BP: (!) 144/85 Pulse: 73   Resp: 16 SpO2: 92 % O2 Device: None (Room air)    Vital Signs with Ranges  Temp:  [98.4  F (36.9  C)-99  F (37.2  C)] 98.6  F (37  C)  Pulse:  [] 73  Resp:  [14-18] 16  BP: (129-144)/(56-85) 144/85  SpO2:  [92 %-94 %] 92 %  225 lbs 6.4 oz    General: Patient awake, alert, NAD, lying in bed  Head:  Normocephalic, atraumatic  Eyes: No icterus  Neck: Symmetric  Respiratory: Breathing unlabored  Cardiac: Skin well-perfused  GI: Obese  Skin:No visible rashes   Neurologic: No focal deficits  Neuropsychiatric: A&O x 3, responding appropriately      Data   Results for orders placed or performed during the hospital encounter of 12/05/20 (from the past 24 hour(s))   Hemoglobin   Result Value Ref Range    Hemoglobin 13.8 13.3 - 17.7 g/dL   Glucose   Result Value Ref Range    Glucose 125 (H) 70 - 99 mg/dL

## 2020-12-07 NOTE — PLAN OF CARE
Patient alert and oriented, VSS, voiding adequately, urine sample collect and sent lab for UA, AMILCAR drains removed, about 40ml output from each. Discharge instructions and medication given to patient who verbalized understanding.

## 2020-12-09 ENCOUNTER — TRANSFERRED RECORDS (OUTPATIENT)
Dept: HEALTH INFORMATION MANAGEMENT | Facility: CLINIC | Age: 65
End: 2020-12-09

## 2021-01-10 ENCOUNTER — HEALTH MAINTENANCE LETTER (OUTPATIENT)
Age: 66
End: 2021-01-10

## 2021-01-11 ENCOUNTER — TRANSFERRED RECORDS (OUTPATIENT)
Dept: HEALTH INFORMATION MANAGEMENT | Facility: CLINIC | Age: 66
End: 2021-01-11

## 2021-02-09 ENCOUNTER — TRANSFERRED RECORDS (OUTPATIENT)
Dept: HEALTH INFORMATION MANAGEMENT | Facility: CLINIC | Age: 66
End: 2021-02-09

## 2021-10-23 ENCOUNTER — HEALTH MAINTENANCE LETTER (OUTPATIENT)
Age: 66
End: 2021-10-23

## 2022-02-12 ENCOUNTER — HEALTH MAINTENANCE LETTER (OUTPATIENT)
Age: 67
End: 2022-02-12

## 2022-10-09 ENCOUNTER — HEALTH MAINTENANCE LETTER (OUTPATIENT)
Age: 67
End: 2022-10-09

## 2023-02-18 ENCOUNTER — HEALTH MAINTENANCE LETTER (OUTPATIENT)
Age: 68
End: 2023-02-18

## 2024-03-16 ENCOUNTER — HEALTH MAINTENANCE LETTER (OUTPATIENT)
Age: 69
End: 2024-03-16

## (undated) DEVICE — SU STRATAFIX MONOCRYL 4-0 SPIRAL 30CM PS-2 SXMP1B117

## (undated) DEVICE — BONE PRESS HENSLER HBP-010

## (undated) DEVICE — ESU CLEANER TIP 31142717

## (undated) DEVICE — SPONGE RAY-TEC 4X8" 7318

## (undated) DEVICE — PACK LARGE SPINE SNE15LSFSE

## (undated) DEVICE — GLOVE PROTEXIS W/NEU-THERA 8.5  2D73TE85

## (undated) DEVICE — SU STRATAFIX PDS PLUS 0 CT 45CM SXPP1A406

## (undated) DEVICE — GLOVE PROTEXIS BLUE W/NEU-THERA 8.5  2D73EB85

## (undated) DEVICE — GLOVE PROTEXIS W/NEU-THERA 7.5  2D73TE75

## (undated) DEVICE — GOWN XXLG REINFORCED 9071EL

## (undated) DEVICE — SU VICRYL 3-0 SH CR 8X18" J774

## (undated) DEVICE — SU VICRYL 0 CT-1 CR 8X18" J740D

## (undated) DEVICE — DRAPE MICROSOPE ZEISS 52X150" 6120

## (undated) DEVICE — DRSG KERLIX FLUFFS X5

## (undated) DEVICE — MARKER SPHERES PASSIVE MEDT PACK 5 8801075

## (undated) DEVICE — RX SURGIFLO HEMOSTATIC MATRIX W/THROMBIN 8ML 2994

## (undated) DEVICE — PREP DURAPREP 26ML APL 8630

## (undated) DEVICE — SU VICRYL 2-0 CT-1 18' J739D

## (undated) DEVICE — DRAIN JACKSON PRATT CHANNEL 10FR RND SIL W/TROCAR JP-2227

## (undated) DEVICE — SUCTION FRAZIER 12FR W/HANDLE K73

## (undated) DEVICE — POSITIONER PT PRONESAFE HEAD REST W/DERMAPROX INSERT 40599

## (undated) DEVICE — DRSG TELFA ISLAND 4X10"

## (undated) DEVICE — COVER TABLE POLY 65X90" 8186

## (undated) DEVICE — SOL NACL 0.9% INJ 250ML BAG 2B1322Q

## (undated) DEVICE — Device

## (undated) DEVICE — ESU ELEC BLADE 4" COATED

## (undated) DEVICE — NDL 20GA 1.5"

## (undated) DEVICE — LINEN TOWEL PACK X5 5464

## (undated) DEVICE — BLADE BONE MILL STRK 3.2MM FINE 5400-702-000

## (undated) DEVICE — DRAPE SHEET REV FOLD 3/4 9349

## (undated) DEVICE — MANIFOLD NEPTUNE 4 PORT 700-20

## (undated) DEVICE — PACK UNIVERSAL SPLIT 29131

## (undated) DEVICE — DRSG TELFA 3X8" 1238

## (undated) DEVICE — ESU GROUND PAD UNIVERSAL W/O CORD

## (undated) DEVICE — ESU BIPOLAR SEALER AQUAMANTYS 6MM 23-112-1

## (undated) DEVICE — DRAIN JACKSON PRATT RESERVOIR 100ML SU130-1305

## (undated) DEVICE — SPONGE COTTONOID 1/2X3" 80-1407

## (undated) DEVICE — BLADE KNIFE SURG 11 371111

## (undated) DEVICE — DRAPE C-ARM 60X42" 1013

## (undated) DEVICE — DRSG TEGADERM 6X8" 1628

## (undated) DEVICE — DRAPE IOBAN INCISE 23X17" 6650EZ

## (undated) DEVICE — SOL WATER IRRIG 1000ML BOTTLE 2F7114

## (undated) DEVICE — KIT PATIENT JACKSON 1 SPK10118

## (undated) DEVICE — DRAPE COVER C-ARM SEAMLESS SNAP-KAP 03-KP26 LATEX FREE

## (undated) DEVICE — SPONGE SURGIFOAM 100 1974

## (undated) RX ORDER — KETAMINE HCL IN NACL, ISO-OSM 100MG/10ML
SYRINGE (ML) INJECTION
Status: DISPENSED
Start: 2020-12-05

## (undated) RX ORDER — CEFAZOLIN SODIUM 2 G/100ML
INJECTION, SOLUTION INTRAVENOUS
Status: DISPENSED
Start: 2020-12-05

## (undated) RX ORDER — CEFAZOLIN SODIUM 1 G/3ML
INJECTION, POWDER, FOR SOLUTION INTRAMUSCULAR; INTRAVENOUS
Status: DISPENSED
Start: 2020-12-05

## (undated) RX ORDER — FENTANYL CITRATE 50 UG/ML
INJECTION, SOLUTION INTRAMUSCULAR; INTRAVENOUS
Status: DISPENSED
Start: 2020-12-05

## (undated) RX ORDER — VECURONIUM BROMIDE 1 MG/ML
INJECTION, POWDER, LYOPHILIZED, FOR SOLUTION INTRAVENOUS
Status: DISPENSED
Start: 2020-12-05

## (undated) RX ORDER — DEXAMETHASONE SODIUM PHOSPHATE 4 MG/ML
INJECTION, SOLUTION INTRA-ARTICULAR; INTRALESIONAL; INTRAMUSCULAR; INTRAVENOUS; SOFT TISSUE
Status: DISPENSED
Start: 2020-12-05

## (undated) RX ORDER — VANCOMYCIN HYDROCHLORIDE 500 MG/10ML
INJECTION, POWDER, LYOPHILIZED, FOR SOLUTION INTRAVENOUS
Status: DISPENSED
Start: 2020-12-05

## (undated) RX ORDER — HYDROMORPHONE HYDROCHLORIDE 1 MG/ML
INJECTION, SOLUTION INTRAMUSCULAR; INTRAVENOUS; SUBCUTANEOUS
Status: DISPENSED
Start: 2020-12-05

## (undated) RX ORDER — BUPIVACAINE HYDROCHLORIDE AND EPINEPHRINE 5; 5 MG/ML; UG/ML
INJECTION, SOLUTION EPIDURAL; INTRACAUDAL; PERINEURAL
Status: DISPENSED
Start: 2020-12-05

## (undated) RX ORDER — VANCOMYCIN HYDROCHLORIDE 1 G/20ML
INJECTION, POWDER, LYOPHILIZED, FOR SOLUTION INTRAVENOUS
Status: DISPENSED
Start: 2020-12-05

## (undated) RX ORDER — ONDANSETRON 2 MG/ML
INJECTION INTRAMUSCULAR; INTRAVENOUS
Status: DISPENSED
Start: 2020-12-05